# Patient Record
Sex: FEMALE | Race: WHITE | NOT HISPANIC OR LATINO | Employment: OTHER | URBAN - NONMETROPOLITAN AREA
[De-identification: names, ages, dates, MRNs, and addresses within clinical notes are randomized per-mention and may not be internally consistent; named-entity substitution may affect disease eponyms.]

---

## 2017-05-24 ENCOUNTER — OFFICE VISIT (OUTPATIENT)
Dept: CARDIOLOGY | Facility: CLINIC | Age: 72
End: 2017-05-24

## 2017-05-24 VITALS
DIASTOLIC BLOOD PRESSURE: 95 MMHG | WEIGHT: 148.8 LBS | HEART RATE: 90 BPM | HEIGHT: 62 IN | OXYGEN SATURATION: 98 % | BODY MASS INDEX: 27.38 KG/M2 | SYSTOLIC BLOOD PRESSURE: 166 MMHG

## 2017-05-24 DIAGNOSIS — F17.200 SMOKING: ICD-10-CM

## 2017-05-24 DIAGNOSIS — I77.9 BILATERAL CAROTID ARTERY DISEASE (HCC): ICD-10-CM

## 2017-05-24 DIAGNOSIS — E78.5 HYPERLIPEMIA: ICD-10-CM

## 2017-05-24 DIAGNOSIS — I42.9 CARDIOMYOPATHY (HCC): ICD-10-CM

## 2017-05-24 DIAGNOSIS — Z00.00 HEALTH MAINTENANCE EXAMINATION: ICD-10-CM

## 2017-05-24 DIAGNOSIS — E78.5 DYSLIPIDEMIA: ICD-10-CM

## 2017-05-24 DIAGNOSIS — I42.0 CARDIOMYOPATHY, DILATED (HCC): ICD-10-CM

## 2017-05-24 DIAGNOSIS — R06.02 SOB (SHORTNESS OF BREATH) ON EXERTION: ICD-10-CM

## 2017-05-24 DIAGNOSIS — R55 SYNCOPE, UNSPECIFIED SYNCOPE TYPE: ICD-10-CM

## 2017-05-24 DIAGNOSIS — Z00.00 HEALTHCARE MAINTENANCE: ICD-10-CM

## 2017-05-24 DIAGNOSIS — E78.5 HYPERLIPIDEMIA, UNSPECIFIED HYPERLIPIDEMIA TYPE: ICD-10-CM

## 2017-05-24 DIAGNOSIS — I10 ESSENTIAL HYPERTENSION: Primary | ICD-10-CM

## 2017-05-24 PROBLEM — IMO0001 SMOKING: Status: ACTIVE | Noted: 2017-05-24

## 2017-05-24 PROCEDURE — 93000 ELECTROCARDIOGRAM COMPLETE: CPT | Performed by: NURSE PRACTITIONER

## 2017-05-24 PROCEDURE — 99214 OFFICE O/P EST MOD 30 MIN: CPT | Performed by: NURSE PRACTITIONER

## 2017-05-24 RX ORDER — PRAVASTATIN SODIUM 40 MG
40 TABLET ORAL DAILY
Qty: 90 TABLET | Refills: 3 | Status: SHIPPED | OUTPATIENT
Start: 2017-05-24 | End: 2018-04-26 | Stop reason: SDUPTHER

## 2017-05-24 RX ORDER — CLONIDINE HYDROCHLORIDE 0.1 MG/1
TABLET ORAL
Qty: 30 TABLET | Refills: 5 | Status: SHIPPED | OUTPATIENT
Start: 2017-05-24 | End: 2018-04-26 | Stop reason: SDUPTHER

## 2017-05-24 RX ORDER — FUROSEMIDE 20 MG/1
20 TABLET ORAL DAILY
Qty: 90 TABLET | Refills: 3 | Status: SHIPPED | OUTPATIENT
Start: 2017-05-24 | End: 2018-04-26 | Stop reason: SDUPTHER

## 2017-05-24 RX ORDER — NICOTINE 21-14-7MG
KIT TRANSDERMAL
Qty: 56 EACH | Refills: 2 | Status: SHIPPED | OUTPATIENT
Start: 2017-05-24 | End: 2018-08-30

## 2017-05-24 RX ORDER — AMLODIPINE BESYLATE 2.5 MG/1
2.5 TABLET ORAL DAILY
Qty: 90 TABLET | Refills: 3 | Status: SHIPPED | OUTPATIENT
Start: 2017-05-24 | End: 2017-07-19

## 2017-05-24 RX ORDER — METOPROLOL SUCCINATE 25 MG/1
25 TABLET, EXTENDED RELEASE ORAL DAILY
Qty: 90 TABLET | Refills: 3 | Status: SHIPPED | OUTPATIENT
Start: 2017-05-24 | End: 2018-04-26 | Stop reason: SDUPTHER

## 2017-05-24 RX ORDER — ASPIRIN 81 MG/1
81 TABLET ORAL DAILY
Qty: 90 TABLET | Refills: 3 | Status: SHIPPED | OUTPATIENT
Start: 2017-05-24 | End: 2019-05-28 | Stop reason: SDUPTHER

## 2017-06-21 ENCOUNTER — HOSPITAL ENCOUNTER (OUTPATIENT)
Dept: CARDIOLOGY | Facility: HOSPITAL | Age: 72
Discharge: HOME | End: 2017-06-21
Admitting: NURSE PRACTITIONER

## 2017-06-21 ENCOUNTER — OUTSIDE FACILITY SERVICE (OUTPATIENT)
Dept: CARDIOLOGY | Facility: CLINIC | Age: 72
End: 2017-06-21

## 2017-06-21 ENCOUNTER — HOSPITAL ENCOUNTER (OUTPATIENT)
Dept: CARDIOLOGY | Facility: HOSPITAL | Age: 72
Discharge: HOME | End: 2017-06-21

## 2017-06-21 PROCEDURE — 93880 EXTRACRANIAL BILAT STUDY: CPT | Performed by: INTERNAL MEDICINE

## 2017-06-21 PROCEDURE — 93306 TTE W/DOPPLER COMPLETE: CPT

## 2017-06-21 PROCEDURE — 93306 TTE W/DOPPLER COMPLETE: CPT | Performed by: INTERNAL MEDICINE

## 2017-06-21 PROCEDURE — 93880 EXTRACRANIAL BILAT STUDY: CPT

## 2017-07-06 ENCOUNTER — DOCUMENTATION (OUTPATIENT)
Dept: CARDIOLOGY | Facility: CLINIC | Age: 72
End: 2017-07-06

## 2017-07-06 NOTE — PROGRESS NOTES
Echo results back in the office, 1-2 mo. F/u recommended. Patient has an appt. Here on 7-19-17. PH,LPN

## 2017-07-19 ENCOUNTER — OFFICE VISIT (OUTPATIENT)
Dept: CARDIOLOGY | Facility: CLINIC | Age: 72
End: 2017-07-19

## 2017-07-19 ENCOUNTER — TELEPHONE (OUTPATIENT)
Dept: CARDIOLOGY | Facility: CLINIC | Age: 72
End: 2017-07-19

## 2017-07-19 VITALS
BODY MASS INDEX: 28.12 KG/M2 | SYSTOLIC BLOOD PRESSURE: 154 MMHG | HEIGHT: 62 IN | HEART RATE: 95 BPM | WEIGHT: 152.8 LBS | DIASTOLIC BLOOD PRESSURE: 73 MMHG | OXYGEN SATURATION: 97 %

## 2017-07-19 DIAGNOSIS — I38 HEART VALVE DISEASE: ICD-10-CM

## 2017-07-19 DIAGNOSIS — I42.9 CARDIOMYOPATHY (HCC): ICD-10-CM

## 2017-07-19 DIAGNOSIS — E78.5 DYSLIPIDEMIA: ICD-10-CM

## 2017-07-19 DIAGNOSIS — I10 ESSENTIAL HYPERTENSION: Primary | ICD-10-CM

## 2017-07-19 DIAGNOSIS — F17.200 SMOKING: ICD-10-CM

## 2017-07-19 PROCEDURE — 99213 OFFICE O/P EST LOW 20 MIN: CPT | Performed by: NURSE PRACTITIONER

## 2017-07-19 RX ORDER — LISINOPRIL 5 MG/1
5 TABLET ORAL DAILY
Qty: 90 TABLET | Refills: 3 | Status: SHIPPED | OUTPATIENT
Start: 2017-07-19 | End: 2018-04-26 | Stop reason: SDUPTHER

## 2017-07-19 NOTE — PROGRESS NOTES
Subjective   Marci Stuart is a 72 y.o. female     Chief Complaint   Patient presents with   • Follow-up     patient appears in office today for follow up on test results       HPI    Problem List:    1. Dilated cardiomyopathy, EF initially felt to be 30 to 35%.  1.1 Most recent echocardiogram indicated , EF of 40-45%.  1.2 Low risk stress test in work-up for dilated cardiomyopathy.  1.3 Echo 6/21/17-mild LVH, EF 40-45%, diastolic dysfunction 2, moderate AR, mild MR, physiological TR, mild OH.  2. Valvular heart disease   a. Moderate MR, Mild-Mod TR, moderate AI and mild-mod PI  2. Hypertension  3. Dyslipidemia  4. Tobacco Habituation, smokes 1/2 pack every 2 days  5. Carotid Duplex 4/2/15 - 16-49% narrowing to HAZEL and LICA, antegrade flow   5.1 carotid ultrasound 6/21/17-16-49% bilateral internal carotid arteries.  To moderate carotid bifurcation disease bilaterally with no flow limiting stenosis on either side.  Antegrade flow both vertebral arteries.    Patient is a 72-year-old female who presents today for a follow-up.  She denies any chest pain, pressure, palpitations, fluttering, dizziness, presyncope, syncope, orthopnea, PND or edema.  She denies any shortness of breath with activity.  She has done really well with the nicotine patches she is down to only 2-3 cigarettes a day.  Says her blood pressure home runs anywhere from 120 to 130 systolic.  Patient says she does have a lot of stress and that's why she still smoking some due to the fact her family always comes to her for advice but then doesn't take it.    We went over her carotid ultrasound, echo and labs.    Current Outpatient Prescriptions   Medication Sig Dispense Refill   • aspirin 81 MG EC tablet Take 1 tablet by mouth Daily. 90 tablet 3   • Calcium Carbonate (CALTRATE 600) 1500 (600 CA) MG tablet Take 1 tablet by mouth daily. 2 daily     • CloNIDine (CATAPRES) 0.1 MG tablet Every 8 hours for SBP > 160 or DBP > 90 30 tablet 5   • furosemide  (LASIX) 20 MG tablet Take 1 tablet by mouth Daily. 90 tablet 3   • metoprolol succinate XL (TOPROL-XL) 25 MG 24 hr tablet Take 1 tablet by mouth Daily. 90 tablet 3   • MULTIPLE VITAMIN PO Take  by mouth daily.     • Nicotine 21-14-7 MG/24HR kit 3 Step pack 21-14-7; 1 patch every 24 hrs 56 each 2   • pravastatin (PRAVACHOL) 40 MG tablet Take 1 tablet by mouth Daily. 90 tablet 3   • lisinopril (PRINIVIL,ZESTRIL) 5 MG tablet Take 1 tablet by mouth Daily. 90 tablet 3     No current facility-administered medications for this visit.        ALLERGIES    Review of patient's allergies indicates no known allergies.    Past Medical History:   Diagnosis Date   • Coronary artery disease    • Hyperlipidemia    • Hypertension        Social History     Social History   • Marital status:      Spouse name: N/A   • Number of children: N/A   • Years of education: N/A     Occupational History   • Not on file.     Social History Main Topics   • Smoking status: Current Every Day Smoker     Packs/day: 0.50     Types: Cigarettes   • Smokeless tobacco: Never Used      Comment: LESS THAN 1 PPD   • Alcohol use No      Comment: A LITTLE WINE   • Drug use: No   • Sexual activity: Not on file     Other Topics Concern   • Not on file     Social History Narrative       Family History   Problem Relation Age of Onset   • Heart attack Father    • Diabetes Other    • Hyperlipidemia Other    • Hypertension Other        Review of Systems   Constitutional: Negative for diaphoresis and fatigue.   HENT: Positive for rhinorrhea. Negative for sneezing.    Eyes: Positive for visual disturbance (wears glasses if driving/reading/or coloring).   Respiratory: Negative for cough, chest tightness, shortness of breath and wheezing.    Cardiovascular: Negative for chest pain, palpitations and leg swelling.   Gastrointestinal: Negative for abdominal pain, nausea and vomiting.   Endocrine: Negative for cold intolerance, heat intolerance, polyphagia and polyuria.  "  Genitourinary: Negative for difficulty urinating, frequency and urgency.   Musculoskeletal: Positive for arthralgias (back) and back pain (due to arthritis). Negative for myalgias, neck pain and neck stiffness.   Skin: Negative.  Negative for rash and wound.   Allergic/Immunologic: Positive for environmental allergies. Negative for food allergies.   Neurological: Negative for dizziness, weakness, light-headedness and headaches.   Hematological: Bruises/bleeds easily (bruises and bleeds easily).   Psychiatric/Behavioral: Positive for agitation (easily agitated). Negative for confusion and sleep disturbance. The patient is nervous/anxious (stays anxious).        Objective   /73 (BP Location: Left arm, Patient Position: Sitting)  Pulse 95  Ht 62\" (157.5 cm)  Wt 152 lb 12.8 oz (69.3 kg)  SpO2 97%  BMI 27.95 kg/m2  Lab Results (most recent)     None        Physical Exam   Constitutional: She is oriented to person, place, and time. Vital signs are normal. She appears well-developed and well-nourished. She is active and cooperative.   HENT:   Head: Normocephalic.   Eyes: Lids are normal.   Wears glasses    Neck: Normal carotid pulses, no hepatojugular reflux and no JVD present. Carotid bruit is not present.   Cardiovascular: Normal rate, regular rhythm and normal heart sounds.    Pulses:       Radial pulses are 2+ on the right side, and 2+ on the left side.        Dorsalis pedis pulses are 2+ on the right side, and 2+ on the left side.        Posterior tibial pulses are 2+ on the right side, and 2+ on the left side.   No edema BLE.    Pulmonary/Chest: Effort normal and breath sounds normal.   Abdominal: Normal appearance and bowel sounds are normal.   Neurological: She is alert and oriented to person, place, and time.   Skin: Skin is warm, dry and intact.   Psychiatric: She has a normal mood and affect. Her speech is normal and behavior is normal. Judgment and thought content normal. Cognition and memory are " normal.       Procedure   Procedures         Assessment/Plan      Diagnosis Plan   1. Essential hypertension  lisinopril (PRINIVIL,ZESTRIL) 5 MG tablet   2. Heart valve disease     3. Dyslipidemia     4. Smoking     5. Cardiomyopathy  lisinopril (PRINIVIL,ZESTRIL) 5 MG tablet       Return in about 6 weeks (around 8/30/2017).       hypertension/cardiomyopathy-we will stop Norvasc and start lisinopril to help improve her ejection fraction.  Heart valve disease-no change.  Lipidemia-she is on pravastatin and her cholesterol is doing well.  She is going to be given information regarding a low triglyceride diet.  Smoking-she has been applauded him encouraged on her smoking cessation efforts.  Patient is doing very well from a cardiac standpoint.  She will continue her medication regimen otherwise.  She will follow-up in 6 weeks or sooner if any changes.  Determine how she is tolerating the lisinopril and how her blood pressure is running.

## 2017-07-19 NOTE — PATIENT INSTRUCTIONS
Hypertension  Hypertension, commonly called high blood pressure, is when the force of blood pumping through your arteries is too strong. Your arteries are the blood vessels that carry blood from your heart throughout your body. A blood pressure reading consists of a higher number over a lower number, such as 110/72. The higher number (systolic) is the pressure inside your arteries when your heart pumps. The lower number (diastolic) is the pressure inside your arteries when your heart relaxes. Ideally you want your blood pressure below 120/80.  Hypertension forces your heart to work harder to pump blood. Your arteries may become narrow or stiff. Having untreated or uncontrolled hypertension can cause heart attack, stroke, kidney disease, and other problems.  RISK FACTORS  Some risk factors for high blood pressure are controllable. Others are not.   Risk factors you cannot control include:   · Race. You may be at higher risk if you are .  · Age. Risk increases with age.  · Gender. Men are at higher risk than women before age 45 years. After age 65, women are at higher risk than men.  Risk factors you can control include:  · Not getting enough exercise or physical activity.  · Being overweight.  · Getting too much fat, sugar, calories, or salt in your diet.  · Drinking too much alcohol.  SIGNS AND SYMPTOMS  Hypertension does not usually cause signs or symptoms. Extremely high blood pressure (hypertensive crisis) may cause headache, anxiety, shortness of breath, and nosebleed.  DIAGNOSIS  To check if you have hypertension, your health care provider will measure your blood pressure while you are seated, with your arm held at the level of your heart. It should be measured at least twice using the same arm. Certain conditions can cause a difference in blood pressure between your right and left arms. A blood pressure reading that is higher than normal on one occasion does not mean that you need treatment. If  it is not clear whether you have high blood pressure, you may be asked to return on a different day to have your blood pressure checked again. Or, you may be asked to monitor your blood pressure at home for 1 or more weeks.  TREATMENT  Treating high blood pressure includes making lifestyle changes and possibly taking medicine. Living a healthy lifestyle can help lower high blood pressure. You may need to change some of your habits.  Lifestyle changes may include:  · Following the DASH diet. This diet is high in fruits, vegetables, and whole grains. It is low in salt, red meat, and added sugars.  · Keep your sodium intake below 2,300 mg per day.  · Getting at least 30-45 minutes of aerobic exercise at least 4 times per week.  · Losing weight if necessary.  · Not smoking.  · Limiting alcoholic beverages.  · Learning ways to reduce stress.  Your health care provider may prescribe medicine if lifestyle changes are not enough to get your blood pressure under control, and if one of the following is true:  · You are 18-59 years of age and your systolic blood pressure is above 140.  · You are 60 years of age or older, and your systolic blood pressure is above 150.  · Your diastolic blood pressure is above 90.  · You have diabetes, and your systolic blood pressure is over 140 or your diastolic blood pressure is over 90.  · You have kidney disease and your blood pressure is above 140/90.  · You have heart disease and your blood pressure is above 140/90.  Your personal target blood pressure may vary depending on your medical conditions, your age, and other factors.  HOME CARE INSTRUCTIONS  · Have your blood pressure rechecked as directed by your health care provider.    · Take medicines only as directed by your health care provider. Follow the directions carefully. Blood pressure medicines must be taken as prescribed. The medicine does not work as well when you skip doses. Skipping doses also puts you at risk for  "problems.  · Do not smoke.    · Monitor your blood pressure at home as directed by your health care provider.   SEEK MEDICAL CARE IF:   · You think you are having a reaction to medicines taken.  · You have recurrent headaches or feel dizzy.  · You have swelling in your ankles.  · You have trouble with your vision.  SEEK IMMEDIATE MEDICAL CARE IF:  · You develop a severe headache or confusion.  · You have unusual weakness, numbness, or feel faint.  · You have severe chest or abdominal pain.  · You vomit repeatedly.  · You have trouble breathing.  MAKE SURE YOU:   · Understand these instructions.  · Will watch your condition.  · Will get help right away if you are not doing well or get worse.     This information is not intended to replace advice given to you by your health care provider. Make sure you discuss any questions you have with your health care provider.     Document Released: 12/18/2006 Document Revised: 05/03/2016 Document Reviewed: 10/10/2014  [employer] Interactive Patient Education ©2017 [employer]  Food Choices to Lower Your Triglycerides  Triglycerides are a type of fat in your blood. High levels of triglycerides can increase the risk of heart disease and stroke. If your triglyceride levels are high, the foods you eat and your eating habits are very important. Choosing the right foods can help lower your triglycerides.   WHAT GENERAL GUIDELINES DO I NEED TO FOLLOW?  · Lose weight if you are overweight.    · Limit or avoid alcohol.    · Fill one half of your plate with vegetables and green salads.    · Limit fruit to two servings a day. Choose fruit instead of juice.    · Make one fourth of your plate whole grains. Look for the word \"whole\" as the first word in the ingredient list.  · Fill one fourth of your plate with lean protein foods.  · Enjoy fatty fish (such as salmon, mackerel, sardines, and tuna) three times a week.    · Choose healthy fats.    · Limit foods high in starch and sugar.  · Eat " more home-cooked food and less restaurant, buffet, and fast food.  · Limit fried foods.  · Cook foods using methods other than frying.  · Limit saturated fats.  · Check ingredient lists to avoid foods with partially hydrogenated oils (trans fats) in them.  WHAT FOODS CAN I EAT?   Grains  Whole grains, such as whole wheat or whole grain breads, crackers, cereals, and pasta. Unsweetened oatmeal, bulgur, barley, quinoa, or brown rice. Corn or whole wheat flour tortillas.   Vegetables  Fresh or frozen vegetables (raw, steamed, roasted, or grilled). Green salads.  Fruits  All fresh, canned (in natural juice), or frozen fruits.  Meat and Other Protein Products  Ground beef (85% or leaner), grass-fed beef, or beef trimmed of fat. Skinless chicken or turkey. Ground chicken or turkey. Pork trimmed of fat. All fish and seafood. Eggs. Dried beans, peas, or lentils. Unsalted nuts or seeds. Unsalted canned or dry beans.  Dairy  Low-fat dairy products, such as skim or 1% milk, 2% or reduced-fat cheeses, low-fat ricotta or cottage cheese, or plain low-fat yogurt.  Fats and Oils  Tub margarines without trans fats. Light or reduced-fat mayonnaise and salad dressings. Avocado. Safflower, olive, or canola oils. Natural peanut or almond butter.  The items listed above may not be a complete list of recommended foods or beverages. Contact your dietitian for more options.  WHAT FOODS ARE NOT RECOMMENDED?   Grains  White bread. White pasta. White rice. Cornbread. Bagels, pastries, and croissants. Crackers that contain trans fat.  Vegetables  White potatoes. Corn. Creamed or fried vegetables. Vegetables in a cheese sauce.  Fruits  Dried fruits. Canned fruit in light or heavy syrup. Fruit juice.  Meat and Other Protein Products  Fatty cuts of meat. Ribs, chicken wings, rosa, sausage, bologna, salami, chitterlings, fatback, hot dogs, bratwurst, and packaged luncheon meats.  Dairy  Whole or 2% milk, cream, half-and-half, and cream cheese.  "Whole-fat or sweetened yogurt. Full-fat cheeses. Nondairy creamers and whipped toppings. Processed cheese, cheese spreads, or cheese curds.  Sweets and Desserts  Corn syrup, sugars, honey, and molasses. Candy. Jam and jelly. Syrup. Sweetened cereals. Cookies, pies, cakes, donuts, muffins, and ice cream.  Fats and Oils  Butter, stick margarine, lard, shortening, ghee, or rosa fat. Coconut, palm kernel, or palm oils.  Beverages  Alcohol. Sweetened drinks (such as sodas, lemonade, and fruit drinks or punches).  The items listed above may not be a complete list of foods and beverages to avoid. Contact your dietitian for more information.     This information is not intended to replace advice given to you by your health care provider. Make sure you discuss any questions you have with your health care provider.     Document Released: 10/05/2005 Document Revised: 01/08/2016 Document Reviewed: 10/22/2014  [employer] Interactive Patient Education ©2017 [employer].    You Can Quit Smoking  If you are ready to quit smoking or are thinking about it, congratulations! You have chosen to help yourself be healthier and live longer! There are lots of different ways to quit smoking. Nicotine gum, nicotine patches, a nicotine inhaler, or nicotine nasal spray can help with physical craving. Hypnosis, support groups, and medicines help break the habit of smoking.  TIPS TO GET OFF AND STAY OFF CIGARETTES  · Learn to predict your moods. Do not let a bad situation be your excuse to have a cigarette. Some situations in your life might tempt you to have a cigarette.  · Ask friends and co-workers not to smoke around you.  · Make your home smoke-free.  · Never have \"just one\" cigarette. It leads to wanting another and another. Remind yourself of your decision to quit.  · On a card, make a list of your reasons for not smoking. Read it at least the same number of times a day as you have a cigarette. Tell yourself everyday, \"I do not want to " "smoke. I choose not to smoke.\"  · Ask someone at home or work to help you with your plan to quit smoking.  · Have something planned after you eat or have a cup of coffee. Take a walk or get other exercise to perk you up. This will help to keep you from overeating.  · Try a relaxation exercise to calm you down and decrease your stress. Remember, you may be tense and nervous the first two weeks after you quit. This will pass.  · Find new activities to keep your hands busy. Play with a pen, coin, or rubber band. Doodle or draw things on paper.  · Brush your teeth right after eating. This will help cut down the craving for the taste of tobacco after meals. You can try mouthwash too.  · Try gum, breath mints, or diet candy to keep something in your mouth.  IF YOU SMOKE AND WANT TO QUIT:  · Do not stock up on cigarettes. Never buy a carton. Wait until one pack is finished before you buy another.  · Never carry cigarettes with you at work or at home.  · Keep cigarettes as far away from you as possible. Leave them with someone else.  · Never carry matches or a lighter with you.  · Ask yourself, \"Do I need this cigarette or is this just a reflex?\"  · Bet with someone that you can quit. Put cigarette money in a [employer] bank every morning. If you smoke, you give up the money. If you do not smoke, by the end of the week, you keep the money.  · Keep trying. It takes 21 days to change a habit!  · Talk to your doctor about using medicines to help you quit. These include nicotine replacement gum, lozenges, or skin patches.     This information is not intended to replace advice given to you by your health care provider. Make sure you discuss any questions you have with your health care provider.     Document Released: 10/14/2010 Document Revised: 03/11/2013 Document Reviewed: 05/03/2016  [employer] Interactive Patient Education ©2017 [employer]    "

## 2017-08-30 ENCOUNTER — OFFICE VISIT (OUTPATIENT)
Dept: CARDIOLOGY | Facility: CLINIC | Age: 72
End: 2017-08-30

## 2017-08-30 VITALS
HEIGHT: 62 IN | OXYGEN SATURATION: 100 % | DIASTOLIC BLOOD PRESSURE: 68 MMHG | BODY MASS INDEX: 27.6 KG/M2 | WEIGHT: 150 LBS | HEART RATE: 70 BPM | SYSTOLIC BLOOD PRESSURE: 134 MMHG

## 2017-08-30 DIAGNOSIS — I10 ESSENTIAL HYPERTENSION: Primary | ICD-10-CM

## 2017-08-30 DIAGNOSIS — I42.0 CARDIOMYOPATHY, DILATED (HCC): ICD-10-CM

## 2017-08-30 DIAGNOSIS — E78.5 DYSLIPIDEMIA: ICD-10-CM

## 2017-08-30 PROCEDURE — 99213 OFFICE O/P EST LOW 20 MIN: CPT | Performed by: NURSE PRACTITIONER

## 2017-08-30 RX ORDER — AZITHROMYCIN 250 MG/1
250 TABLET, FILM COATED ORAL DAILY
COMMUNITY
Start: 2017-08-28 | End: 2018-08-30

## 2017-08-30 NOTE — PATIENT INSTRUCTIONS

## 2017-08-30 NOTE — PROGRESS NOTES
Subjective   Marci Stuart is a 72 y.o. female     Chief Complaint   Patient presents with   • Follow-up     patient appears in office today for routine follow up ; patient denies any  new onset cardiac issues at this time; she did state she currentyl has URI which PCP is treating    • Hypertension     patient states B/P has been WNL with medications        HPI    Problem List:    1. Dilated cardiomyopathy, EF initially felt to be 30 to 35%.  1.1 Most recent echocardiogram indicated , EF of 40-45%.  1.2 Low risk stress test in work-up for dilated cardiomyopathy.  1.3 Echo 6/21/17-mild LVH, EF 40-45%, diastolic dysfunction 2, moderate AR, mild MR, physiological TR, mild MA.  2. Valvular heart disease   a. Moderate MR, Mild-Mod TR, moderate AI and mild-mod PI  2. Hypertension  3. Dyslipidemia  4. Tobacco Habituation - stopped 8/15/17  5. Carotid Duplex 4/2/15 - 16-49% narrowing to HAZEL and LICA, antegrade flow   5.1 carotid ultrasound 6/21/17-16-49% bilateral internal carotid arteries.  To moderate carotid bifurcation disease bilaterally with no flow limiting stenosis on either side.  Antegrade flow both vertebral arteries.    Patient is a 72-year-old female who presents today for follow-up.  She denies any chest pain, pressure, palpitations, fluttering, dizziness, presyncope, syncope, orthopnea, PND or edema.  She does not have any shortness of breath with activity.  She says she has done very well she has taken up in fluttering to keep her from smoking.  She is using her nicotine patch and has not had a cigarette in 15 days which I applauded her.  Patient has current upper respiratory infection and PCP is currently treating her.  She says since she started lisinopril her blood pressure has been excellent.  PCP monitors her cholesterol.    Current Outpatient Prescriptions   Medication Sig Dispense Refill   • aspirin 81 MG EC tablet Take 1 tablet by mouth Daily. 90 tablet 3   • azithromycin (ZITHROMAX) 250 MG  tablet Take 250 mg by mouth Daily.     • Calcium Carbonate (CALTRATE 600) 1500 (600 CA) MG tablet Take 1 tablet by mouth daily. 2 daily     • CloNIDine (CATAPRES) 0.1 MG tablet Every 8 hours for SBP > 160 or DBP > 90 30 tablet 5   • furosemide (LASIX) 20 MG tablet Take 1 tablet by mouth Daily. 90 tablet 3   • lisinopril (PRINIVIL,ZESTRIL) 5 MG tablet Take 1 tablet by mouth Daily. 90 tablet 3   • metoprolol succinate XL (TOPROL-XL) 25 MG 24 hr tablet Take 1 tablet by mouth Daily. 90 tablet 3   • MULTIPLE VITAMIN PO Take  by mouth daily.     • Nicotine 21-14-7 MG/24HR kit 3 Step pack 21-14-7; 1 patch every 24 hrs 56 each 2   • pravastatin (PRAVACHOL) 40 MG tablet Take 1 tablet by mouth Daily. 90 tablet 3     No current facility-administered medications for this visit.        ALLERGIES    Review of patient's allergies indicates no known allergies.    Past Medical History:   Diagnosis Date   • Coronary artery disease    • Hyperlipidemia    • Hypertension        Social History     Social History   • Marital status:      Spouse name: N/A   • Number of children: N/A   • Years of education: N/A     Occupational History   • Not on file.     Social History Main Topics   • Smoking status: Current Every Day Smoker     Packs/day: 0.50     Types: Cigarettes   • Smokeless tobacco: Never Used      Comment: LESS THAN 1 PPD   • Alcohol use No      Comment: A LITTLE WINE   • Drug use: No   • Sexual activity: Not on file     Other Topics Concern   • Not on file     Social History Narrative       Family History   Problem Relation Age of Onset   • Heart attack Father    • Diabetes Other    • Hyperlipidemia Other    • Hypertension Other        Review of Systems   Constitutional: Negative for diaphoresis and fatigue.   HENT: Positive for congestion (due to URI), sinus pressure (due to URI) and sneezing (due to URI).         Pt currently has URI ; PCP has on Z-Pack at this time; has had for 2 weeks - PCP on Monday      Eyes: Positive  "for visual disturbance (wears glasses daily).   Respiratory: Positive for cough (pt states she has been coughing due to URI). Negative for chest tightness (denies chest tightness), shortness of breath (deneis SOB) and wheezing (denies wheezing).    Cardiovascular: Negative for chest pain (denies CP), palpitations and leg swelling.   Gastrointestinal: Negative for abdominal pain, nausea and vomiting.   Endocrine: Negative for cold intolerance, heat intolerance, polyphagia and polyuria.   Genitourinary: Negative for difficulty urinating, frequency and urgency.   Musculoskeletal: Negative for arthralgias, back pain, myalgias, neck pain and neck stiffness.   Skin: Negative.  Negative for rash and wound.   Allergic/Immunologic: Positive for environmental allergies (seasonal allergies). Negative for food allergies.   Neurological: Negative for dizziness, weakness and headaches.   Hematological: Bruises/bleeds easily (bruises easily).   Psychiatric/Behavioral: Negative for agitation, confusion and sleep disturbance (denies waking up smothering). The patient is not nervous/anxious.        Objective   /68 (BP Location: Left arm, Patient Position: Sitting)  Pulse 70  Ht 62\" (157.5 cm)  Wt 150 lb (68 kg)  SpO2 100%  BMI 27.44 kg/m2  Lab Results (most recent)     None        Physical Exam   Constitutional: She is oriented to person, place, and time. Vital signs are normal. She appears well-developed and well-nourished. She is active and cooperative.   HENT:   Head: Normocephalic.   Eyes: Lids are normal.   Wears glasses    Neck: Normal carotid pulses, no hepatojugular reflux and no JVD present. Carotid bruit is not present.   Cardiovascular: Normal rate, regular rhythm and normal heart sounds.    Pulses:       Radial pulses are 2+ on the right side, and 2+ on the left side.        Dorsalis pedis pulses are 2+ on the right side, and 2+ on the left side.        Posterior tibial pulses are 2+ on the right side, and 2+ " on the left side.   No edema BLE.    Pulmonary/Chest: Effort normal and breath sounds normal.   Abdominal: Normal appearance and bowel sounds are normal.   Neurological: She is alert and oriented to person, place, and time.   Skin: Skin is warm, dry and intact.   Psychiatric: She has a normal mood and affect. Her speech is normal and behavior is normal. Judgment and thought content normal. Cognition and memory are normal.       Procedure   Procedures         Assessment/Plan      Diagnosis Plan   1. Essential hypertension     2. Dyslipidemia     3. Cardiomyopathy, dilated         Return in about 1 year (around 8/30/2018).    Hypertension-doing very well.  Dyslipidemia-patient is on pravastatin and monitor by PCP.  Cardiomyopathy-patient is on Ace and beta blocker.  Patient has currently stopped smoking for the last 15 days and is using a nicotine patch.  She will continue her medication regimen and she would like to go back to her one-year follow-up.  She will come back sooner if any changes.

## 2018-04-26 DIAGNOSIS — I42.9 CARDIOMYOPATHY, UNSPECIFIED TYPE (HCC): ICD-10-CM

## 2018-04-26 DIAGNOSIS — I10 ESSENTIAL HYPERTENSION: ICD-10-CM

## 2018-04-26 DIAGNOSIS — E78.5 HYPERLIPIDEMIA, UNSPECIFIED HYPERLIPIDEMIA TYPE: ICD-10-CM

## 2018-04-26 RX ORDER — LISINOPRIL 5 MG/1
5 TABLET ORAL DAILY
Qty: 90 TABLET | Refills: 2 | Status: SHIPPED | OUTPATIENT
Start: 2018-04-26 | End: 2018-08-30 | Stop reason: SDUPTHER

## 2018-04-26 RX ORDER — FUROSEMIDE 20 MG/1
20 TABLET ORAL DAILY
Qty: 90 TABLET | Refills: 2 | Status: SHIPPED | OUTPATIENT
Start: 2018-04-26 | End: 2018-08-30 | Stop reason: SDUPTHER

## 2018-04-26 RX ORDER — PRAVASTATIN SODIUM 40 MG
40 TABLET ORAL DAILY
Qty: 90 TABLET | Refills: 2 | Status: SHIPPED | OUTPATIENT
Start: 2018-04-26 | End: 2018-08-30

## 2018-04-26 RX ORDER — CLONIDINE HYDROCHLORIDE 0.1 MG/1
TABLET ORAL
Qty: 180 TABLET | Refills: 2 | Status: SHIPPED | OUTPATIENT
Start: 2018-04-26 | End: 2021-04-22 | Stop reason: SDUPTHER

## 2018-04-26 RX ORDER — METOPROLOL SUCCINATE 25 MG/1
25 TABLET, EXTENDED RELEASE ORAL DAILY
Qty: 90 TABLET | Refills: 2 | Status: SHIPPED | OUTPATIENT
Start: 2018-04-26 | End: 2018-08-30 | Stop reason: SDUPTHER

## 2018-08-30 ENCOUNTER — OFFICE VISIT (OUTPATIENT)
Dept: CARDIOLOGY | Facility: CLINIC | Age: 73
End: 2018-08-30

## 2018-08-30 VITALS
OXYGEN SATURATION: 96 % | WEIGHT: 145 LBS | HEART RATE: 87 BPM | SYSTOLIC BLOOD PRESSURE: 117 MMHG | HEIGHT: 62 IN | BODY MASS INDEX: 26.68 KG/M2 | DIASTOLIC BLOOD PRESSURE: 64 MMHG

## 2018-08-30 DIAGNOSIS — F17.200 SMOKING: ICD-10-CM

## 2018-08-30 DIAGNOSIS — I10 ESSENTIAL HYPERTENSION: Primary | ICD-10-CM

## 2018-08-30 DIAGNOSIS — Z00.00 HEALTHCARE MAINTENANCE: ICD-10-CM

## 2018-08-30 DIAGNOSIS — E78.5 DYSLIPIDEMIA: ICD-10-CM

## 2018-08-30 DIAGNOSIS — I42.9 CARDIOMYOPATHY, UNSPECIFIED TYPE (HCC): ICD-10-CM

## 2018-08-30 PROCEDURE — 99213 OFFICE O/P EST LOW 20 MIN: CPT | Performed by: NURSE PRACTITIONER

## 2018-08-30 PROCEDURE — 93000 ELECTROCARDIOGRAM COMPLETE: CPT | Performed by: NURSE PRACTITIONER

## 2018-08-30 RX ORDER — LISINOPRIL 5 MG/1
5 TABLET ORAL DAILY
Qty: 90 TABLET | Refills: 2 | Status: SHIPPED | OUTPATIENT
Start: 2018-08-30 | End: 2019-03-11 | Stop reason: SDUPTHER

## 2018-08-30 RX ORDER — FUROSEMIDE 20 MG/1
20 TABLET ORAL DAILY
Qty: 90 TABLET | Refills: 2 | Status: SHIPPED | OUTPATIENT
Start: 2018-08-30 | End: 2019-01-31 | Stop reason: SDUPTHER

## 2018-08-30 RX ORDER — METOPROLOL SUCCINATE 25 MG/1
25 TABLET, EXTENDED RELEASE ORAL DAILY
Qty: 90 TABLET | Refills: 2 | Status: SHIPPED | OUTPATIENT
Start: 2018-08-30 | End: 2019-01-31 | Stop reason: SDUPTHER

## 2018-08-30 RX ORDER — OMEGA-3 FATTY ACIDS/FISH OIL 300-1000MG
1000 CAPSULE ORAL DAILY
Qty: 30 EACH | Refills: 0 | Status: SHIPPED | OUTPATIENT
Start: 2018-08-30 | End: 2018-10-29 | Stop reason: SDUPTHER

## 2018-08-30 NOTE — PATIENT INSTRUCTIONS
Dyslipidemia  Dyslipidemia is an imbalance of waxy, fat-like substances (lipids) in the blood. The body needs lipids in small amounts. Dyslipidemia often involves a high level of cholesterol or triglycerides, which are types of lipids.  Common forms of dyslipidemia include:  · High levels of bad cholesterol (LDL cholesterol). LDL is the type of cholesterol that causes fatty deposits (plaques) to build up in the blood vessels that carry blood away from your heart (arteries).  · Low levels of good cholesterol (HDL cholesterol). HDL cholesterol is the type of cholesterol that protects against heart disease. High levels of HDL remove the LDL buildup from arteries.  · High levels of triglycerides. Triglycerides are a fatty substance in the blood that is linked to a buildup of plaques in the arteries.    You can develop dyslipidemia because of the genes you are born with (primary dyslipidemia) or changes that occur during your life (secondary dyslipidemia), or as a side effect of certain medical treatments.  What are the causes?  Primary dyslipidemia is caused by changes (mutations) in genes that are passed down through families (inherited). These mutations cause several types of dyslipidemia. Mutations can result in disorders that make the body produce too much LDL cholesterol or triglycerides, or not enough HDL cholesterol. These disorders may lead to heart disease, arterial disease, or stroke at an early age.  Causes of secondary dyslipidemia include certain lifestyle choices and diseases that lead to dyslipidemia, such as:  · Eating a diet that is high in animal fat.  · Not getting enough activity or exercise (having a sedentary lifestyle).  · Having diabetes, kidney disease, liver disease, or thyroid disease.  · Drinking large amounts of alcohol.  · Using certain types of drugs.    What increases the risk?  You may be at greater risk for dyslipidemia if you are an older man or if you are a woman who has gone through  menopause. Other risk factors include:  · Having a family history of dyslipidemia.  · Taking certain medicines, including birth control pills, steroids, some diuretics, beta-blockers, and some medicines for HIV.  · Smoking cigarettes.  · Eating a high-fat diet.  · Drinking large amounts of alcohol.  · Having certain medical conditions such as diabetes, polycystic ovary syndrome (PCOS), pregnancy, kidney disease, liver disease, or hypothyroidism.  · Not exercising regularly.  · Being overweight or obese with too much belly fat.    What are the signs or symptoms?  Dyslipidemia does not usually cause any symptoms.  Very high lipid levels can cause fatty bumps under the skin (xanthomas) or a white or gray ring around the black center (pupil) of the eye. Very high triglyceride levels can cause inflammation of the pancreas (pancreatitis).  How is this diagnosed?  Your health care provider may diagnose dyslipidemia based on a routine blood test (fasting blood test). Because most people do not have symptoms of the condition, this blood testing (lipid profile) is done on adults age 20 and older and is repeated every 5 years. This test checks:  · Total cholesterol. This is a measure of the total amount of cholesterol in your blood, including LDL cholesterol, HDL cholesterol, and triglycerides. A healthy number is below 200.  · LDL cholesterol. The target number for LDL cholesterol is different for each person, depending on individual risk factors. For most people, a number below 100 is healthy. Ask your health care provider what your LDL cholesterol number should be.  · HDL cholesterol. An HDL level of 60 or higher is best because it helps to protect against heart disease. A number below 40 for men or below 50 for women increases the risk for heart disease.  · Triglycerides. A healthy triglyceride number is below 150.    If your lipid profile is abnormal, your health care provider may do other blood tests to get more  information about your condition.  How is this treated?  Treatment depends on the type of dyslipidemia that you have and your other risk factors for heart disease and stroke. Your health care provider will have a target range for your lipid levels based on this information.  For many people, treatment starts with lifestyle changes, such as diet and exercise. Your health care provider may recommend that you:  · Get regular exercise.  · Make changes to your diet.  · Quit smoking if you smoke.    If diet changes and exercise do not help you reach your goals, your health care provider may also prescribe medicine to lower lipids. The most commonly prescribed type of medicine lowers your LDL cholesterol (statin drug). If you have a high triglyceride level, your provider may prescribe another type of drug (fibrate) or an omega-3 fish oil supplement, or both.  Follow these instructions at home:  · Take over-the-counter and prescription medicines only as told by your health care provider. This includes supplements.  · Get regular exercise. Start an aerobic exercise and strength training program as told by your health care provider. Ask your health care provider what activities are safe for you. Your health care provider may recommend:  ? 30 minutes of aerobic activity 4-6 days a week. Brisk walking is an example of aerobic activity.  ? Strength training 2 days a week.  · Eat a healthy diet as told by your health care provider. This can help you reach and maintain a healthy weight, lower your LDL cholesterol, and raise your HDL cholesterol. It may help to work with a diet and nutrition specialist (dietitian) to make a plan that is right for you. Your dietitian or health care provider may recommend:  ? Limiting your calories, if you are overweight.  ? Eating more fruits, vegetables, whole grains, fish, and lean meats.  ? Limiting saturated fat, trans fat, and cholesterol.  · Follow instructions from your health care provider  or dietitian about eating or drinking restrictions.  · Limit alcohol intake to no more than one drink per day for nonpregnant women and two drinks per day for men. One drink equals 12 oz of beer, 5 oz of wine, or 1½ oz of hard liquor.  · Do not use any products that contain nicotine or tobacco, such as cigarettes and e-cigarettes. If you need help quitting, ask your health care provider.  · Keep all follow-up visits as told by your health care provider. This is important.  Contact a health care provider if:  · You are having trouble sticking to your exercise or diet plan.  · You are struggling to quit smoking or control your use of alcohol.  Summary  · Dyslipidemia is an imbalance of waxy, fat-like substances (lipids) in the blood. The body needs lipids in small amounts. Dyslipidemia often involves a high level of cholesterol or triglycerides, which are types of lipids.  · Treatment depends on the type of dyslipidemia that you have and your other risk factors for heart disease and stroke.  · For many people, treatment starts with lifestyle changes, such as diet and exercise. Your health care provider may also prescribe medicine to lower lipids.  This information is not intended to replace advice given to you by your health care provider. Make sure you discuss any questions you have with your health care provider.  Document Released: 12/23/2014 Document Revised: 08/14/2017 Document Reviewed: 08/14/2017  [employer] Patient Education © 2018 [employer]  Steps to Quit Smoking  Smoking tobacco can be bad for your health. It can also affect almost every organ in your body. Smoking puts you and people around you at risk for many serious long-lasting (chronic) diseases. Quitting smoking is hard, but it is one of the best things that you can do for your health. It is never too late to quit.  What are the benefits of quitting smoking?  When you quit smoking, you lower your risk for getting serious diseases and  conditions. They can include:  · Lung cancer or lung disease.  · Heart disease.  · Stroke.  · Heart attack.  · Not being able to have children (infertility).  · Weak bones (osteoporosis) and broken bones (fractures).    If you have coughing, wheezing, and shortness of breath, those symptoms may get better when you quit. You may also get sick less often. If you are pregnant, quitting smoking can help to lower your chances of having a baby of low birth weight.  What can I do to help me quit smoking?  Talk with your doctor about what can help you quit smoking. Some things you can do (strategies) include:  · Quitting smoking totally, instead of slowly cutting back how much you smoke over a period of time.  · Going to in-person counseling. You are more likely to quit if you go to many counseling sessions.  · Using resources and support systems, such as:  ? Online chats with a counselor.  ? Phone quitlines.  ? Printed self-help materials.  ? Support groups or group counseling.  ? Text messaging programs.  ? Mobile phone apps or applications.  · Taking medicines. Some of these medicines may have nicotine in them. If you are pregnant or breastfeeding, do not take any medicines to quit smoking unless your doctor says it is okay. Talk with your doctor about counseling or other things that can help you.    Talk with your doctor about using more than one strategy at the same time, such as taking medicines while you are also going to in-person counseling. This can help make quitting easier.  What things can I do to make it easier to quit?  Quitting smoking might feel very hard at first, but there is a lot that you can do to make it easier. Take these steps:  · Talk to your family and friends. Ask them to support and encourage you.  · Call phone quitlines, reach out to support groups, or work with a counselor.  · Ask people who smoke to not smoke around you.  · Avoid places that make you want (trigger) to smoke, such  as:  ? Bars.  ? Parties.  ? Smoke-break areas at work.  · Spend time with people who do not smoke.  · Lower the stress in your life. Stress can make you want to smoke. Try these things to help your stress:  ? Getting regular exercise.  ? Deep-breathing exercises.  ? Yoga.  ? Meditating.  ? Doing a body scan. To do this, close your eyes, focus on one area of your body at a time from head to toe, and notice which parts of your body are tense. Try to relax the muscles in those areas.  · Download or buy apps on your mobile phone or tablet that can help you stick to your quit plan. There are many free apps, such as QuitGuide from the CDC (Centers for Disease Control and Prevention). You can find more support from smokefree.gov and other websites.    This information is not intended to replace advice given to you by your health care provider. Make sure you discuss any questions you have with your health care provider.  Document Released: 10/14/2010 Document Revised: 08/15/2017 Document Reviewed: 05/03/2016  [employer] Interactive Patient Education © 2018 [employer]

## 2018-08-30 NOTE — PROGRESS NOTES
Subjective   Marci Stuart is a 73 y.o. female     Chief Complaint   Patient presents with   • Follow-up     presents for 1 year f/u   • Hypertension   • Shortness of Breath       HPI    Problem List:    1. Dilated cardiomyopathy, EF initially felt to be 30 to 35%.  1.1 Most recent echocardiogram indicated , EF of 40-45%.  1.2 Low risk stress test in work-up for dilated cardiomyopathy.  1.3 Echo 6/21/17-mild LVH, EF 40-45%, diastolic dysfunction 2, moderate AR, mild MR, physiological TR, mild MS.  2. Valvular heart disease   a. Moderate MR, Mild-Mod TR, moderate AI and mild-mod PI  2. Hypertension  3. Dyslipidemia  4. Tobacco Habituation - stopped 8/15/17  5. Carotid Duplex 4/2/15 - 16-49% narrowing to HAZEL and LICA, antegrade flow   5.1 carotid ultrasound 6/21/17-16-49% bilateral internal carotid arteries.  To moderate carotid bifurcation disease bilaterally with no flow limiting stenosis on either side.  Antegrade flow both vertebral arteries.    Patient is a 73-year-old female who presents today for follow-up.  He denies any chest pain, pressure, palpitations, fluttering, dizziness, presyncope, syncope, orthopnea, PND or edema.  She says she only gets short of breath with exertion if she is frustrated at something.  She has felt great and been taking care of her son to is about to have back surgery.  She still smokes about 10 cigarettes per day.  She is not had any recent blood work.  Patient will like to come off of her statin.    Current Outpatient Prescriptions   Medication Sig Dispense Refill   • aspirin 81 MG EC tablet Take 1 tablet by mouth Daily. 90 tablet 3   • Calcium Carbonate (CALTRATE 600) 1500 (600 CA) MG tablet Take 1 tablet by mouth daily. 2 daily     • CloNIDine (CATAPRES) 0.1 MG tablet Every 8 hours for SBP > 160 or DBP > 90 180 tablet 2   • furosemide (LASIX) 20 MG tablet Take 1 tablet by mouth Daily. 90 tablet 2   • lisinopril (PRINIVIL,ZESTRIL) 5 MG tablet Take 1 tablet by mouth Daily. 90  tablet 2   • metoprolol succinate XL (TOPROL-XL) 25 MG 24 hr tablet Take 1 tablet by mouth Daily. 90 tablet 2   • MULTIPLE VITAMIN PO Take  by mouth daily.     • Omega 3 1000 MG capsule Take 1,000 mg by mouth Daily. 30 each 0     No current facility-administered medications for this visit.        ALLERGIES    Patient has no known allergies.    Past Medical History:   Diagnosis Date   • Coronary artery disease    • Hyperlipidemia    • Hypertension        Social History     Social History   • Marital status:      Spouse name: N/A   • Number of children: N/A   • Years of education: N/A     Occupational History   • Not on file.     Social History Main Topics   • Smoking status: Current Every Day Smoker     Packs/day: 0.50     Types: Cigarettes   • Smokeless tobacco: Never Used      Comment: LESS THAN 1 PPD   • Alcohol use No      Comment: A LITTLE WINE   • Drug use: No   • Sexual activity: Not on file     Other Topics Concern   • Not on file     Social History Narrative   • No narrative on file       Family History   Problem Relation Age of Onset   • Heart attack Father    • Diabetes Other    • Hyperlipidemia Other    • Hypertension Other    • No Known Problems Mother        Review of Systems   Constitutional: Negative for chills, diaphoresis, fatigue and fever.   HENT: Negative for congestion, nosebleeds, postnasal drip, rhinorrhea, sinus pain, sinus pressure and tinnitus.    Eyes: Positive for visual disturbance (wears glasses ).   Respiratory: Positive for shortness of breath (on exertion; if she gets frusterated at something ). Negative for apnea, chest tightness and wheezing.    Cardiovascular: Negative for chest pain, palpitations and leg swelling.   Gastrointestinal: Negative for abdominal pain, blood in stool, constipation, diarrhea, nausea and vomiting.   Endocrine: Negative.    Genitourinary: Negative for hematuria.   Musculoskeletal: Positive for arthralgias and back pain. Negative for myalgias and  "neck pain.   Allergic/Immunologic: Positive for environmental allergies.   Neurological: Negative for dizziness, syncope, weakness, light-headedness, numbness and headaches.   Hematological: Bruises/bleeds easily (bruise).   Psychiatric/Behavioral: Negative for agitation, confusion and sleep disturbance. The patient is not nervous/anxious.        Objective   /64 (BP Location: Left arm, Patient Position: Sitting)   Pulse 87   Ht 157.5 cm (62.01\")   Wt 65.8 kg (145 lb)   SpO2 96%   BMI 26.51 kg/m²   Vitals:    08/30/18 1209   BP: 117/64   BP Location: Left arm   Patient Position: Sitting   Pulse: 87   SpO2: 96%   Weight: 65.8 kg (145 lb)   Height: 157.5 cm (62.01\")      Lab Results (most recent)     None        Physical Exam   Constitutional: She is oriented to person, place, and time. Vital signs are normal. She appears well-developed and well-nourished. She is active and cooperative.   HENT:   Head: Normocephalic.   Eyes: Lids are normal.   Wears glasses    Cardiovascular: Normal rate, regular rhythm and normal heart sounds.    Pulses:       Radial pulses are 2+ on the right side, and 2+ on the left side.        Dorsalis pedis pulses are 2+ on the right side, and 2+ on the left side.        Posterior tibial pulses are 2+ on the right side, and 2+ on the left side.   No edema BLE.   Pulmonary/Chest: Effort normal and breath sounds normal.   Abdominal: Normal appearance and bowel sounds are normal.   Neurological: She is alert and oriented to person, place, and time.   Skin: Skin is warm, dry and intact.   Psychiatric: She has a normal mood and affect. Her speech is normal and behavior is normal. Judgment and thought content normal. Cognition and memory are normal.       Procedure     ECG 12 Lead  Date/Time: 8/30/2018 12:27 PM  Performed by: SMITH HAMM  Authorized by: SMITH HAMM   Comparison: compared with previous ECG from 5/24/2017  Similar to previous ECG  Rhythm: sinus rhythm  Rate: " normal  BPM: 83  QRS axis: left  Other findings: LVH  Clinical impression: non-specific ECG                 Assessment/Plan      Diagnosis Plan   1. Essential hypertension  metoprolol succinate XL (TOPROL-XL) 25 MG 24 hr tablet    lisinopril (PRINIVIL,ZESTRIL) 5 MG tablet    ECG 12 Lead    TSH   2. Healthcare maintenance  Lipid Panel    Comprehensive Metabolic Panel    TSH   3. Dyslipidemia  Lipid Panel    Comprehensive Metabolic Panel    Omega 3 1000 MG capsule    TSH   4. Smoking     5. Cardiomyopathy, unspecified type (CMS/HCC)  furosemide (LASIX) 20 MG tablet    lisinopril (PRINIVIL,ZESTRIL) 5 MG tablet       Return in about 6 months (around 2/28/2019).       dyslipidemia-patient is on pravastatin and will like to stop this.  She will start omega-3.  She will get a CMP, lipids and TSH in 6 weeks.  Hypertension-patient doing well.  Smoking-patient encouraged on cessation.  Cardiomyopathy-patient's on diuretic, Ace and beta.  She will continue her medication regimen.  She will follow-up in 6 months or sooner if any changes.  I advised if her cholesterol was elevated meaning her LDL she may have to go back on the statin.  She understands.        Patient's Body mass index is 26.51 kg/m². BMI is within normal parameters. No follow-up required.      Electronically signed by:

## 2018-10-29 DIAGNOSIS — E78.5 DYSLIPIDEMIA: ICD-10-CM

## 2018-10-29 RX ORDER — OMEGA-3 FATTY ACIDS/FISH OIL 300-1000MG
1000 CAPSULE ORAL DAILY
Qty: 30 EACH | Refills: 11 | Status: SHIPPED | OUTPATIENT
Start: 2018-10-29 | End: 2020-05-27 | Stop reason: SDUPTHER

## 2018-11-09 RX ORDER — OMEGA-3-ACID ETHYL ESTERS 1 G/1
1 CAPSULE, LIQUID FILLED ORAL DAILY
Qty: 30 CAPSULE | Refills: 6 | Status: SHIPPED | OUTPATIENT
Start: 2018-11-09 | End: 2019-05-28 | Stop reason: SDUPTHER

## 2019-01-31 DIAGNOSIS — I10 ESSENTIAL HYPERTENSION: ICD-10-CM

## 2019-01-31 DIAGNOSIS — I42.9 CARDIOMYOPATHY, UNSPECIFIED TYPE (HCC): ICD-10-CM

## 2019-01-31 RX ORDER — FUROSEMIDE 20 MG/1
20 TABLET ORAL DAILY
Qty: 90 TABLET | Refills: 2 | Status: SHIPPED | OUTPATIENT
Start: 2019-01-31 | End: 2019-05-28 | Stop reason: SDUPTHER

## 2019-01-31 RX ORDER — METOPROLOL SUCCINATE 25 MG/1
25 TABLET, EXTENDED RELEASE ORAL DAILY
Qty: 90 TABLET | Refills: 2 | Status: SHIPPED | OUTPATIENT
Start: 2019-01-31 | End: 2019-05-28 | Stop reason: SDUPTHER

## 2019-03-11 DIAGNOSIS — I10 ESSENTIAL HYPERTENSION: ICD-10-CM

## 2019-03-11 DIAGNOSIS — I42.9 CARDIOMYOPATHY, UNSPECIFIED TYPE (HCC): ICD-10-CM

## 2019-03-11 RX ORDER — LISINOPRIL 5 MG/1
5 TABLET ORAL DAILY
Qty: 90 TABLET | Refills: 2 | Status: SHIPPED | OUTPATIENT
Start: 2019-03-11 | End: 2019-05-28 | Stop reason: SDUPTHER

## 2019-03-11 NOTE — TELEPHONE ENCOUNTER
Pt called for refill for lisinopril, has f/u apt. May.  Sent to [facility] per pt request.  CHELLY,CMA

## 2019-05-28 ENCOUNTER — OFFICE VISIT (OUTPATIENT)
Dept: CARDIOLOGY | Facility: CLINIC | Age: 74
End: 2019-05-28

## 2019-05-28 VITALS
HEART RATE: 60 BPM | SYSTOLIC BLOOD PRESSURE: 124 MMHG | DIASTOLIC BLOOD PRESSURE: 54 MMHG | HEIGHT: 62 IN | OXYGEN SATURATION: 98 % | BODY MASS INDEX: 25.21 KG/M2 | WEIGHT: 137 LBS

## 2019-05-28 DIAGNOSIS — I42.9 CARDIOMYOPATHY, UNSPECIFIED TYPE (HCC): ICD-10-CM

## 2019-05-28 DIAGNOSIS — I42.0 CARDIOMYOPATHY, DILATED (HCC): ICD-10-CM

## 2019-05-28 DIAGNOSIS — E78.5 DYSLIPIDEMIA: ICD-10-CM

## 2019-05-28 DIAGNOSIS — F17.200 SMOKING: ICD-10-CM

## 2019-05-28 DIAGNOSIS — I10 ESSENTIAL HYPERTENSION: Primary | ICD-10-CM

## 2019-05-28 DIAGNOSIS — R06.02 BREATH SHORTNESS: ICD-10-CM

## 2019-05-28 PROCEDURE — 99213 OFFICE O/P EST LOW 20 MIN: CPT | Performed by: NURSE PRACTITIONER

## 2019-05-28 RX ORDER — ASPIRIN 81 MG/1
81 TABLET ORAL DAILY
Qty: 90 TABLET | Refills: 3 | Status: SHIPPED | OUTPATIENT
Start: 2019-05-28 | End: 2020-05-27 | Stop reason: SDUPTHER

## 2019-05-28 RX ORDER — LISINOPRIL 5 MG/1
5 TABLET ORAL DAILY
Qty: 90 TABLET | Refills: 2 | Status: SHIPPED | OUTPATIENT
Start: 2019-05-28 | End: 2019-11-19 | Stop reason: SDUPTHER

## 2019-05-28 RX ORDER — DIPHENHYDRAMINE HCL 25 MG
CAPSULE ORAL NIGHTLY PRN
COMMUNITY

## 2019-05-28 RX ORDER — METOPROLOL SUCCINATE 25 MG/1
25 TABLET, EXTENDED RELEASE ORAL DAILY
Qty: 90 TABLET | Refills: 2 | Status: SHIPPED | OUTPATIENT
Start: 2019-05-28 | End: 2019-11-11 | Stop reason: SDUPTHER

## 2019-05-28 RX ORDER — FUROSEMIDE 20 MG/1
20 TABLET ORAL DAILY
Qty: 90 TABLET | Refills: 2 | Status: SHIPPED | OUTPATIENT
Start: 2019-05-28 | End: 2019-11-11 | Stop reason: SDUPTHER

## 2019-05-28 NOTE — PROGRESS NOTES
Subjective   Marci Staurt is a 73 y.o. female     Chief Complaint   Patient presents with   • Follow-up     Pt in ice for 6mth follow up    • Hypertension       HPI    Problem List:    1. Dilated cardiomyopathy, EF initially felt to be 30 to 35%.  1.1 Most recent echocardiogram indicated , EF of 40-45%.  1.2 Low risk stress test in work-up for dilated cardiomyopathy.  1.3 Echo 6/21/17-mild LVH, EF 40-45%, diastolic dysfunction 2, moderate AR, mild MR, physiological TR, mild LA.  2. Valvular heart disease   a. Moderate MR, Mild-Mod TR, moderate AI and mild-mod PI  2. Hypertension  3. Dyslipidemia  4. Tobacco Habituation - stopped 8/15/17  5. Carotid Duplex 4/2/15 - 16-49% narrowing to HAZEL and LICA, antegrade flow   5.1 carotid ultrasound 6/21/17-16-49% bilateral internal carotid arteries.  To moderate carotid bifurcation disease bilaterally with no flow limiting stenosis on either side.  Antegrade flow both vertebral arteries.    Patient is a 73-year-old female who presents today for follow-up.  She denies any chest pain, pressure, palpations, fluttering, dizziness, presyncope, syncope, orthopnea or PND.  She says she will get lipid swelling when it is hot out.  She denies any shortness of breath with activity.  She says she is been walking on a treadmill every day however she is staying super busy now so she is not having to.  She is down to half a pack a day.     Current Outpatient Medications   Medication Sig Dispense Refill   • aspirin 81 MG EC tablet Take 1 tablet by mouth Daily. 90 tablet 3   • Calcium Carbonate (CALTRATE 600) 1500 (600 CA) MG tablet Take 1 tablet by mouth Daily.     • CloNIDine (CATAPRES) 0.1 MG tablet Every 8 hours for SBP > 160 or DBP > 90 180 tablet 2   • diphenhydrAMINE HCl (BENADRYL ALLERGY PO) Take  by mouth At Night As Needed.     • furosemide (LASIX) 20 MG tablet Take 1 tablet by mouth Daily. 90 tablet 2   • lisinopril (PRINIVIL,ZESTRIL) 5 MG tablet Take 1 tablet by mouth  Daily. 90 tablet 2   • metoprolol succinate XL (TOPROL-XL) 25 MG 24 hr tablet Take 1 tablet by mouth Daily. 90 tablet 2   • MULTIPLE VITAMIN PO Take  by mouth daily.     • Omega 3 1000 MG capsule Take 1,000 mg by mouth Daily. 30 each 11     No current facility-administered medications for this visit.        ALLERGIES    Patient has no known allergies.    Past Medical History:   Diagnosis Date   • Coronary artery disease    • Hyperlipidemia    • Hypertension        Social History     Socioeconomic History   • Marital status:      Spouse name: Not on file   • Number of children: Not on file   • Years of education: Not on file   • Highest education level: Not on file   Tobacco Use   • Smoking status: Current Every Day Smoker     Packs/day: 0.50     Types: Cigarettes   • Smokeless tobacco: Never Used   • Tobacco comment: LESS THAN 1 PPD   Substance and Sexual Activity   • Alcohol use: No     Comment: A LITTLE WINE   • Drug use: No       Family History   Problem Relation Age of Onset   • Heart attack Father    • Diabetes Other    • Hyperlipidemia Other    • Hypertension Other    • No Known Problems Mother        Review of Systems   Constitutional: Negative for fatigue.   HENT: Positive for sneezing. Negative for congestion, rhinorrhea and sore throat.    Eyes: Positive for visual disturbance (glasses daily ).   Respiratory: Negative for chest tightness and shortness of breath.    Cardiovascular: Positive for leg swelling (BLE edema when it's hot out ). Negative for chest pain (Denies CP ) and palpitations.   Gastrointestinal: Negative for abdominal pain, constipation, diarrhea, nausea and vomiting.   Endocrine: Positive for cold intolerance (back and forth ) and heat intolerance (back and forth ).   Genitourinary: Negative for difficulty urinating, dysuria, frequency, hematuria and urgency.   Musculoskeletal: Positive for arthralgias. Negative for back pain and neck pain.   Skin: Negative for rash and wound.  "  Allergic/Immunologic: Positive for environmental allergies (seasonal ). Negative for food allergies.   Neurological: Negative for dizziness, syncope, weakness, light-headedness, numbness and headaches.   Hematological: Bruises/bleeds easily (bruises).   Psychiatric/Behavioral: Negative for sleep disturbance.       Objective   /54   Pulse 60   Ht 157.5 cm (62\")   Wt 62.1 kg (137 lb)   SpO2 98%   BMI 25.06 kg/m²   Vitals:    05/28/19 1335   BP: 124/54   Pulse: 60   SpO2: 98%   Weight: 62.1 kg (137 lb)   Height: 157.5 cm (62\")      Lab Results (most recent)     None        Physical Exam   Constitutional: She is oriented to person, place, and time. Vital signs are normal. She appears well-developed and well-nourished. She is active and cooperative.   HENT:   Head: Normocephalic.   Eyes: Lids are normal.   Wears glasses    Neck: Normal carotid pulses, no hepatojugular reflux and no JVD present. Carotid bruit is not present.   Cardiovascular: Normal rate and regular rhythm.   Murmur heard.   Systolic (RUSB and LUSB 1-2/6) murmur is present.  Pulses:       Radial pulses are 2+ on the right side, and 2+ on the left side.        Dorsalis pedis pulses are 2+ on the right side, and 2+ on the left side.        Posterior tibial pulses are 2+ on the right side, and 2+ on the left side.   No edema BLE.    Pulmonary/Chest: Effort normal and breath sounds normal.   Abdominal: Normal appearance and bowel sounds are normal.   Neurological: She is alert and oriented to person, place, and time.   Skin: Skin is warm, dry and intact. Bruising noted.   Psychiatric: She has a normal mood and affect. Her speech is normal and behavior is normal. Judgment and thought content normal. Cognition and memory are normal.       Procedure   Procedures         Assessment/Plan      Diagnosis Plan   1. Essential hypertension  lisinopril (PRINIVIL,ZESTRIL) 5 MG tablet    metoprolol succinate XL (TOPROL-XL) 25 MG 24 hr tablet    aspirin 81 MG " EC tablet   2. Cardiomyopathy, dilated (CMS/HCC)  aspirin 81 MG EC tablet   3. Breath shortness     4. Dyslipidemia     5. Smoking     6. Cardiomyopathy, unspecified type (CMS/HCC)  lisinopril (PRINIVIL,ZESTRIL) 5 MG tablet    furosemide (LASIX) 20 MG tablet       Return in about 6 months (around 11/28/2019).    Hypertension-patient is doing very well on the Toprol lisinopril.  Cardiomyopathy-patient's on beta-blocker, ACE and diuretic.  Shortness of breath-resolved.  Dyslipidemia-patient is taking just omega-3 fatty acids.  Smoking-encourage cessation.  She will continue her medication regimen.  She will follow-up in 6 months or sooner if any changes.       I advised Marci of the risks of continuing to use tobacco, and I provided her with tobacco cessation educational materials in the After Visit Summary.     During this visit, I spent <3 minutes counseling the patient regarding tobacco cessation.    Patient's Body mass index is 25.06 kg/m². BMI is within normal parameters. No follow-up required..      Electronically signed by:

## 2019-05-28 NOTE — PATIENT INSTRUCTIONS
Steps to Quit Smoking  Smoking tobacco can be bad for your health. It can also affect almost every organ in your body. Smoking puts you and people around you at risk for many serious long-lasting (chronic) diseases. Quitting smoking is hard, but it is one of the best things that you can do for your health. It is never too late to quit.  What are the benefits of quitting smoking?  When you quit smoking, you lower your risk for getting serious diseases and conditions. They can include:  · Lung cancer or lung disease.  · Heart disease.  · Stroke.  · Heart attack.  · Not being able to have children (infertility).  · Weak bones (osteoporosis) and broken bones (fractures).    If you have coughing, wheezing, and shortness of breath, those symptoms may get better when you quit. You may also get sick less often. If you are pregnant, quitting smoking can help to lower your chances of having a baby of low birth weight.  What can I do to help me quit smoking?  Talk with your doctor about what can help you quit smoking. Some things you can do (strategies) include:  · Quitting smoking totally, instead of slowly cutting back how much you smoke over a period of time.  · Going to in-person counseling. You are more likely to quit if you go to many counseling sessions.  · Using resources and support systems, such as:  ? Online chats with a counselor.  ? Phone quitlines.  ? Printed self-help materials.  ? Support groups or group counseling.  ? Text messaging programs.  ? Mobile phone apps or applications.  · Taking medicines. Some of these medicines may have nicotine in them. If you are pregnant or breastfeeding, do not take any medicines to quit smoking unless your doctor says it is okay. Talk with your doctor about counseling or other things that can help you.    Talk with your doctor about using more than one strategy at the same time, such as taking medicines while you are also going to in-person counseling. This can help make  quitting easier.  What things can I do to make it easier to quit?  Quitting smoking might feel very hard at first, but there is a lot that you can do to make it easier. Take these steps:  · Talk to your family and friends. Ask them to support and encourage you.  · Call phone quitlines, reach out to support groups, or work with a counselor.  · Ask people who smoke to not smoke around you.  · Avoid places that make you want (trigger) to smoke, such as:  ? Bars.  ? Parties.  ? Smoke-break areas at work.  · Spend time with people who do not smoke.  · Lower the stress in your life. Stress can make you want to smoke. Try these things to help your stress:  ? Getting regular exercise.  ? Deep-breathing exercises.  ? Yoga.  ? Meditating.  ? Doing a body scan. To do this, close your eyes, focus on one area of your body at a time from head to toe, and notice which parts of your body are tense. Try to relax the muscles in those areas.  · Download or buy apps on your mobile phone or tablet that can help you stick to your quit plan. There are many free apps, such as QuitGuide from the CDC (Centers for Disease Control and Prevention). You can find more support from smokefree.gov and other websites.    This information is not intended to replace advice given to you by your health care provider. Make sure you discuss any questions you have with your health care provider.  Document Released: 10/14/2010 Document Revised: 08/15/2017 Document Reviewed: 05/03/2016  [employer] Patient Education © 2019 [employer]

## 2019-11-11 DIAGNOSIS — I10 ESSENTIAL HYPERTENSION: ICD-10-CM

## 2019-11-11 DIAGNOSIS — I42.9 CARDIOMYOPATHY, UNSPECIFIED TYPE (HCC): ICD-10-CM

## 2019-11-11 RX ORDER — METOPROLOL SUCCINATE 25 MG/1
25 TABLET, EXTENDED RELEASE ORAL DAILY
Qty: 90 TABLET | Refills: 2 | Status: SHIPPED | OUTPATIENT
Start: 2019-11-11 | End: 2020-05-27 | Stop reason: SDUPTHER

## 2019-11-11 RX ORDER — FUROSEMIDE 20 MG/1
20 TABLET ORAL DAILY
Qty: 90 TABLET | Refills: 2 | Status: SHIPPED | OUTPATIENT
Start: 2019-11-11 | End: 2020-05-27 | Stop reason: SDUPTHER

## 2019-11-11 NOTE — TELEPHONE ENCOUNTER
Pt called requesting a refill on Metoprolol 25 mg and water pill. Pt has an appt scheduled 11/19/19 @ 1:45pm with Sonali Colbret. Pt requests meds to be sent to [facility].

## 2019-11-19 ENCOUNTER — OFFICE VISIT (OUTPATIENT)
Dept: CARDIOLOGY | Facility: CLINIC | Age: 74
End: 2019-11-19

## 2019-11-19 VITALS
OXYGEN SATURATION: 96 % | SYSTOLIC BLOOD PRESSURE: 145 MMHG | HEART RATE: 66 BPM | BODY MASS INDEX: 24.51 KG/M2 | DIASTOLIC BLOOD PRESSURE: 72 MMHG | WEIGHT: 133.2 LBS | HEIGHT: 62 IN

## 2019-11-19 DIAGNOSIS — I42.0 CARDIOMYOPATHY, DILATED (HCC): ICD-10-CM

## 2019-11-19 DIAGNOSIS — I10 ESSENTIAL HYPERTENSION: Primary | ICD-10-CM

## 2019-11-19 DIAGNOSIS — I42.9 CARDIOMYOPATHY, UNSPECIFIED TYPE (HCC): ICD-10-CM

## 2019-11-19 DIAGNOSIS — E78.5 DYSLIPIDEMIA: ICD-10-CM

## 2019-11-19 DIAGNOSIS — F17.200 SMOKING: ICD-10-CM

## 2019-11-19 DIAGNOSIS — I65.23 BILATERAL CAROTID ARTERY STENOSIS: ICD-10-CM

## 2019-11-19 PROCEDURE — 99406 BEHAV CHNG SMOKING 3-10 MIN: CPT | Performed by: NURSE PRACTITIONER

## 2019-11-19 PROCEDURE — 99214 OFFICE O/P EST MOD 30 MIN: CPT | Performed by: NURSE PRACTITIONER

## 2019-11-19 PROCEDURE — 93000 ELECTROCARDIOGRAM COMPLETE: CPT | Performed by: NURSE PRACTITIONER

## 2019-11-19 RX ORDER — LISINOPRIL 5 MG/1
5 TABLET ORAL DAILY
Qty: 90 TABLET | Refills: 3 | Status: SHIPPED | OUTPATIENT
Start: 2019-11-19 | End: 2020-05-27 | Stop reason: SDUPTHER

## 2019-11-19 NOTE — PROGRESS NOTES
Subjective   Marci Staurt is a 74 y.o. female     Chief Complaint   Patient presents with   • Follow-up   • Hypertension       HPI    Problem List:    1. Dilated cardiomyopathy, EF initially felt to be 30 to 35%.  1.1 Most recent echocardiogram indicated , EF of 40-45%.  1.2 Low risk stress test in work-up for dilated cardiomyopathy.  1.3 Echo 6/21/17-mild LVH, EF 40-45%, diastolic dysfunction 2, moderate AR, mild MR, physiological TR, mild MS.  2. Valvular heart disease   a. Moderate MR, Mild-Mod TR, moderate AI and mild-mod PI  2. Hypertension  3. Dyslipidemia  4. Tobacco Habituation - stopped 8/15/17  5. Carotid Duplex 4/2/15 - 16-49% narrowing to HAZEL and LICA, antegrade flow   5.1 carotid ultrasound 6/21/17-16-49% bilateral internal carotid arteries.  To moderate carotid bifurcation disease bilaterally with no flow limiting stenosis on either side.  Antegrade flow both vertebral arteries.    Patient is a 74-year-old female who presents today for follow-up.  She denies any chest pain, pressure, palpitations, fluttering, dizziness, presyncope, syncope, orthopnea or PND.  She says she does get some swelling but typically will go down overnight and is only use if she is been on her feet a lot.  She denies any shortness of breath with activity.  She says she sometimes will get fatigue with activity but not always.  She is still smoking just under a pack a day.  Her brother recently had 18 inches of colon removed for colon cancer but is doing very well.  Patient's cholesterol is been good.    Current Outpatient Medications on File Prior to Visit   Medication Sig Dispense Refill   • aspirin 81 MG EC tablet Take 1 tablet by mouth Daily. 90 tablet 3   • Calcium Carbonate (CALTRATE 600) 1500 (600 CA) MG tablet Take 1 tablet by mouth Daily.     • CloNIDine (CATAPRES) 0.1 MG tablet Every 8 hours for SBP > 160 or DBP > 90 180 tablet 2   • diphenhydrAMINE HCl (BENADRYL ALLERGY PO) Take  by mouth At Night As Needed.    "  • furosemide (LASIX) 20 MG tablet Take 1 tablet by mouth Daily. 90 tablet 2   • metoprolol succinate XL (TOPROL-XL) 25 MG 24 hr tablet Take 1 tablet by mouth Daily. 90 tablet 2   • MULTIPLE VITAMIN PO Take  by mouth daily.     • Omega 3 1000 MG capsule Take 1,000 mg by mouth Daily. 30 each 11   • [DISCONTINUED] lisinopril (PRINIVIL,ZESTRIL) 5 MG tablet Take 1 tablet by mouth Daily. 90 tablet 2     No current facility-administered medications on file prior to visit.        ALLERGIES    Patient has no known allergies.    Past Medical History:   Diagnosis Date   • Coronary artery disease    • Hyperlipidemia    • Hypertension        Social History     Socioeconomic History   • Marital status:      Spouse name: Not on file   • Number of children: Not on file   • Years of education: Not on file   • Highest education level: Not on file   Tobacco Use   • Smoking status: Current Every Day Smoker     Packs/day: 1.00     Years: 39.00     Pack years: 39.00     Types: Cigarettes   • Smokeless tobacco: Never Used   Substance and Sexual Activity   • Alcohol use: No     Comment: A LITTLE WINE   • Drug use: No       Family History   Problem Relation Age of Onset   • Heart attack Father    • Diabetes Other    • Hyperlipidemia Other    • Hypertension Other    • No Known Problems Mother        Review of Systems   Constitutional: Positive for fatigue (Sometimes w/ increased activity ). Negative for diaphoresis.   HENT: Positive for nosebleeds (\"a little bit, not much\" ) and rhinorrhea (allergies ). Negative for congestion and sore throat.    Eyes: Positive for visual disturbance (  glasses daily ).   Respiratory: Negative for chest tightness and shortness of breath.    Cardiovascular: Positive for leg swelling (States she has BLE edema when she's up on her feet a lot, goes down overnight ). Negative for chest pain (Denies CP ) and palpitations.   Gastrointestinal: Positive for diarrhea. Negative for abdominal pain, blood in " "stool, constipation, nausea and vomiting.   Endocrine: Negative for cold intolerance and heat intolerance.   Genitourinary: Negative for difficulty urinating, dysuria, frequency, hematuria and urgency.   Musculoskeletal: Positive for arthralgias (hands ). Negative for back pain and neck pain.   Skin: Negative for rash and wound.   Allergic/Immunologic: Positive for environmental allergies (seasonal ). Negative for food allergies.   Neurological: Negative for dizziness, syncope, weakness, light-headedness, numbness and headaches.   Hematological: Bruises/bleeds easily (bruises).   Psychiatric/Behavioral: Negative for sleep disturbance.       Objective   /72   Pulse 66   Ht 157.5 cm (62\")   Wt 60.4 kg (133 lb 3.2 oz)   SpO2 96%   BMI 24.36 kg/m²   Vitals:    11/19/19 1308   BP: 145/72   Pulse: 66   SpO2: 96%   Weight: 60.4 kg (133 lb 3.2 oz)   Height: 157.5 cm (62\")      Lab Results (most recent)     None        Physical Exam   Constitutional: She is oriented to person, place, and time. Vital signs are normal. She appears well-developed and well-nourished. She is active and cooperative.   HENT:   Head: Normocephalic.   Eyes: Lids are normal.   Neck: Normal carotid pulses, no hepatojugular reflux and no JVD present. Carotid bruit is not present.   Cardiovascular: Normal rate and regular rhythm.   Murmur heard.   Systolic (RUSB and LUSB 1-2/6) murmur is present.  Pulses:       Radial pulses are 2+ on the right side, and 2+ on the left side.        Dorsalis pedis pulses are 2+ on the right side, and 2+ on the left side.        Posterior tibial pulses are 2+ on the right side, and 2+ on the left side.   No edema BLE.   Pulmonary/Chest: Effort normal and breath sounds normal.   Abdominal: Normal appearance and bowel sounds are normal.   Neurological: She is alert and oriented to person, place, and time.   Skin: Skin is warm, dry and intact.   Psychiatric: She has a normal mood and affect. Her speech is normal " and behavior is normal. Judgment and thought content normal. Cognition and memory are normal.       Procedure     ECG 12 Lead  Date/Time: 11/19/2019 1:30 PM  Performed by: Sonali Colbert APRN  Authorized by: Sonali Colbert APRN   Comparison: compared with previous ECG from 8/30/2018  Rhythm: sinus rhythm and sinus arrhythmia  Rate: normal  BPM: 64  Conduction: non-specific intraventricular conduction delay  ST Depression: V6  QRS axis: left  Other findings: non-specific ST-T wave changes    Clinical impression: abnormal EKG                 Assessment/Plan      Diagnosis Plan   1. Essential hypertension  lisinopril (PRINIVIL,ZESTRIL) 5 MG tablet    ECG 12 Lead    Adult Transthoracic Echo Complete W/ Cont if Necessary Per Protocol   2. Bilateral carotid artery stenosis  Adult Transthoracic Echo Complete W/ Cont if Necessary Per Protocol    Duplex Carotid Ultrasound CAR   3. Cardiomyopathy, dilated (CMS/HCC)  Adult Transthoracic Echo Complete W/ Cont if Necessary Per Protocol   4. Dyslipidemia  Adult Transthoracic Echo Complete W/ Cont if Necessary Per Protocol   5. Smoking  Adult Transthoracic Echo Complete W/ Cont if Necessary Per Protocol   6. Cardiomyopathy, unspecified type (CMS/HCC)  lisinopril (PRINIVIL,ZESTRIL) 5 MG tablet    Adult Transthoracic Echo Complete W/ Cont if Necessary Per Protocol       Return in about 6 months (around 5/19/2020).    Hypertension/carotid artery disease/cardiomyopathy/shortness of lipidemia/smoking-patient will have repeat echocardiogram but she would like to wait until May.  Carotid artery disease-patient will have repeat carotid artery ultrasound but again she wants to wait until May.  She will continue her medication regimen otherwise.  She will follow-up in 6 months or sooner if any changes.  Patient says her blood pressure has been doing excellent she is not sure why is elevated today.  She says it could have just been the drive over.        Marci Stuart is a current  cigarettes user.  She currently smokes >1 pack of cigarettes per day for a duration of 40 years. I have educated her on the risk of diseases from using tobacco products such as cancer, COPD and heart diease.     I advised her to quit and she is willing to quit. We have discussed the following method/s for tobacco cessation:  Education Material.  States she wants patches, but insurance won't cover them.     I spent 3  minutes counseling the patient.        Patient's Body mass index is 24.36 kg/m². BMI is within normal parameters. No follow-up required..      Electronically signed by:

## 2019-11-19 NOTE — PATIENT INSTRUCTIONS
Steps to Quit Smoking    Smoking tobacco can be bad for your health. It can also affect almost every organ in your body. Smoking puts you and people around you at risk for many serious long-lasting (chronic) diseases. Quitting smoking is hard, but it is one of the best things that you can do for your health. It is never too late to quit.  What are the benefits of quitting smoking?  When you quit smoking, you lower your risk for getting serious diseases and conditions. They can include:  · Lung cancer or lung disease.  · Heart disease.  · Stroke.  · Heart attack.  · Not being able to have children (infertility).  · Weak bones (osteoporosis) and broken bones (fractures).  If you have coughing, wheezing, and shortness of breath, those symptoms may get better when you quit. You may also get sick less often. If you are pregnant, quitting smoking can help to lower your chances of having a baby of low birth weight.  What can I do to help me quit smoking?  Talk with your doctor about what can help you quit smoking. Some things you can do (strategies) include:  · Quitting smoking totally, instead of slowly cutting back how much you smoke over a period of time.  · Going to in-person counseling. You are more likely to quit if you go to many counseling sessions.  · Using resources and support systems, such as:  ? Online chats with a counselor.  ? Phone quitlines.  ? Printed self-help materials.  ? Support groups or group counseling.  ? Text messaging programs.  ? Mobile phone apps or applications.  · Taking medicines. Some of these medicines may have nicotine in them. If you are pregnant or breastfeeding, do not take any medicines to quit smoking unless your doctor says it is okay. Talk with your doctor about counseling or other things that can help you.  Talk with your doctor about using more than one strategy at the same time, such as taking medicines while you are also going to in-person counseling. This can help make  quitting easier.  What things can I do to make it easier to quit?  Quitting smoking might feel very hard at first, but there is a lot that you can do to make it easier. Take these steps:  · Talk to your family and friends. Ask them to support and encourage you.  · Call phone quitlines, reach out to support groups, or work with a counselor.  · Ask people who smoke to not smoke around you.  · Avoid places that make you want (trigger) to smoke, such as:  ? Bars.  ? Parties.  ? Smoke-break areas at work.  · Spend time with people who do not smoke.  · Lower the stress in your life. Stress can make you want to smoke. Try these things to help your stress:  ? Getting regular exercise.  ? Deep-breathing exercises.  ? Yoga.  ? Meditating.  ? Doing a body scan. To do this, close your eyes, focus on one area of your body at a time from head to toe, and notice which parts of your body are tense. Try to relax the muscles in those areas.  · Download or buy apps on your mobile phone or tablet that can help you stick to your quit plan. There are many free apps, such as QuitGuide from the CDC (Centers for Disease Control and Prevention). You can find more support from smokefree.gov and other websites.  This information is not intended to replace advice given to you by your health care provider. Make sure you discuss any questions you have with your health care provider.  Document Released: 10/14/2010 Document Revised: 08/15/2017 Document Reviewed: 05/03/2016  [employer] Interactive Patient Education © 2019 [employer]

## 2019-12-09 DIAGNOSIS — I10 ESSENTIAL HYPERTENSION: ICD-10-CM

## 2019-12-09 DIAGNOSIS — I42.9 CARDIOMYOPATHY, UNSPECIFIED TYPE (HCC): ICD-10-CM

## 2019-12-09 RX ORDER — LISINOPRIL 5 MG/1
TABLET ORAL
Qty: 90 TABLET | Refills: 1 | OUTPATIENT
Start: 2019-12-09

## 2020-05-19 ENCOUNTER — HOSPITAL ENCOUNTER (OUTPATIENT)
Dept: CARDIOLOGY | Facility: HOSPITAL | Age: 75
Discharge: HOME | End: 2020-05-19

## 2020-05-19 ENCOUNTER — HOSPITAL ENCOUNTER (OUTPATIENT)
Dept: CARDIOLOGY | Facility: HOSPITAL | Age: 75
Discharge: HOME | End: 2020-05-19
Admitting: NURSE PRACTITIONER

## 2020-05-19 VITALS — HEIGHT: 62 IN | WEIGHT: 133.16 LBS | BODY MASS INDEX: 24.5 KG/M2

## 2020-05-19 DIAGNOSIS — E78.5 DYSLIPIDEMIA: ICD-10-CM

## 2020-05-19 DIAGNOSIS — I42.9 CARDIOMYOPATHY, UNSPECIFIED TYPE (HCC): ICD-10-CM

## 2020-05-19 DIAGNOSIS — I42.0 CARDIOMYOPATHY, DILATED (HCC): ICD-10-CM

## 2020-05-19 DIAGNOSIS — F17.200 SMOKING: ICD-10-CM

## 2020-05-19 DIAGNOSIS — I65.23 BILATERAL CAROTID ARTERY STENOSIS: ICD-10-CM

## 2020-05-19 DIAGNOSIS — I10 ESSENTIAL HYPERTENSION: ICD-10-CM

## 2020-05-19 PROCEDURE — 93880 EXTRACRANIAL BILAT STUDY: CPT | Performed by: INTERNAL MEDICINE

## 2020-05-19 PROCEDURE — 93306 TTE W/DOPPLER COMPLETE: CPT

## 2020-05-19 PROCEDURE — 93306 TTE W/DOPPLER COMPLETE: CPT | Performed by: INTERNAL MEDICINE

## 2020-05-19 PROCEDURE — 93880 EXTRACRANIAL BILAT STUDY: CPT

## 2020-05-26 ENCOUNTER — TELEPHONE (OUTPATIENT)
Dept: CARDIOLOGY | Facility: CLINIC | Age: 75
End: 2020-05-26

## 2020-05-26 DIAGNOSIS — I65.21 CAROTID ARTERY STENOSIS, SYMPTOMATIC, RIGHT: Primary | ICD-10-CM

## 2020-05-26 LAB
BH CV ECHO MEAS - ACS: 1.7 CM
BH CV ECHO MEAS - AI DEC SLOPE: 169.5 CM/SEC^2
BH CV ECHO MEAS - AI MAX PG: 51.1 MMHG
BH CV ECHO MEAS - AI MAX VEL: 357.5 CM/SEC
BH CV ECHO MEAS - AI P1/2T: 617.8 MSEC
BH CV ECHO MEAS - AO MAX PG (FULL): 4 MMHG
BH CV ECHO MEAS - AO MAX PG: 12.3 MMHG
BH CV ECHO MEAS - AO MEAN PG (FULL): 3 MMHG
BH CV ECHO MEAS - AO MEAN PG: 7 MMHG
BH CV ECHO MEAS - AO ROOT AREA (BSA CORRECTED): 1.8
BH CV ECHO MEAS - AO ROOT AREA: 6.6 CM^2
BH CV ECHO MEAS - AO ROOT DIAM: 2.9 CM
BH CV ECHO MEAS - AO V2 MAX: 175 CM/SEC
BH CV ECHO MEAS - AO V2 MEAN: 120 CM/SEC
BH CV ECHO MEAS - AO V2 VTI: 46.6 CM
BH CV ECHO MEAS - BSA(HAYCOCK): 1.6 M^2
BH CV ECHO MEAS - BSA(HAYCOCK): 1.6 M^2
BH CV ECHO MEAS - BSA: 1.6 M^2
BH CV ECHO MEAS - BSA: 1.6 M^2
BH CV ECHO MEAS - BZI_BMI: 24.3 KILOGRAMS/M^2
BH CV ECHO MEAS - BZI_BMI: 24.3 KILOGRAMS/M^2
BH CV ECHO MEAS - BZI_METRIC_HEIGHT: 157.5 CM
BH CV ECHO MEAS - BZI_METRIC_HEIGHT: 157.5 CM
BH CV ECHO MEAS - BZI_METRIC_WEIGHT: 60.3 KG
BH CV ECHO MEAS - BZI_METRIC_WEIGHT: 60.3 KG
BH CV ECHO MEAS - EDV(CUBED): 80.6 ML
BH CV ECHO MEAS - EDV(TEICH): 84 ML
BH CV ECHO MEAS - EF(CUBED): 59 %
BH CV ECHO MEAS - EF(TEICH): 50.9 %
BH CV ECHO MEAS - ESV(CUBED): 33.1 ML
BH CV ECHO MEAS - ESV(TEICH): 41.3 ML
BH CV ECHO MEAS - FS: 25.7 %
BH CV ECHO MEAS - IVS/LVPW: 0.88
BH CV ECHO MEAS - IVSD: 0.84 CM
BH CV ECHO MEAS - LA DIMENSION: 3.7 CM
BH CV ECHO MEAS - LA/AO: 1.3
BH CV ECHO MEAS - LAT PEAK E' VEL: 10.7 CM/SEC
BH CV ECHO MEAS - LV IVRT: 0.14 SEC
BH CV ECHO MEAS - LV MASS(C)D: 124 GRAMS
BH CV ECHO MEAS - LV MASS(C)DI: 77.2 GRAMS/M^2
BH CV ECHO MEAS - LV MAX PG: 8.3 MMHG
BH CV ECHO MEAS - LV MEAN PG: 4 MMHG
BH CV ECHO MEAS - LV V1 MAX: 144 CM/SEC
BH CV ECHO MEAS - LV V1 MEAN: 85.4 CM/SEC
BH CV ECHO MEAS - LV V1 VTI: 29.2 CM
BH CV ECHO MEAS - LVIDD: 4.3 CM
BH CV ECHO MEAS - LVIDS: 3.2 CM
BH CV ECHO MEAS - LVPWD: 0.95 CM
BH CV ECHO MEAS - MED PEAK E' VEL: 6.1 CM/SEC
BH CV ECHO MEAS - MV A MAX VEL: 113 CM/SEC
BH CV ECHO MEAS - MV DEC SLOPE: 408 CM/SEC^2
BH CV ECHO MEAS - MV DEC TIME: 0.26 SEC
BH CV ECHO MEAS - MV E MAX VEL: 83.5 CM/SEC
BH CV ECHO MEAS - MV E/A: 0.74
BH CV ECHO MEAS - MV MAX PG: 5.4 MMHG
BH CV ECHO MEAS - MV MEAN PG: 2 MMHG
BH CV ECHO MEAS - MV P1/2T MAX VEL: 87.9 CM/SEC
BH CV ECHO MEAS - MV P1/2T: 63.1 MSEC
BH CV ECHO MEAS - MV V2 MAX: 116 CM/SEC
BH CV ECHO MEAS - MV V2 MEAN: 66 CM/SEC
BH CV ECHO MEAS - MV V2 VTI: 36 CM
BH CV ECHO MEAS - MVA P1/2T LCG: 2.5 CM^2
BH CV ECHO MEAS - MVA(P1/2T): 3.5 CM^2
BH CV ECHO MEAS - PA MAX PG (FULL): 1 MMHG
BH CV ECHO MEAS - PA MAX PG: 4.5 MMHG
BH CV ECHO MEAS - PA MEAN PG (FULL): 0 MMHG
BH CV ECHO MEAS - PA MEAN PG: 2 MMHG
BH CV ECHO MEAS - PA V2 MAX: 106 CM/SEC
BH CV ECHO MEAS - PA V2 MEAN: 65.2 CM/SEC
BH CV ECHO MEAS - PA V2 VTI: 22.7 CM
BH CV ECHO MEAS - PI END-D VEL: 78.2 CM/SEC
BH CV ECHO MEAS - RAP SYSTOLE: 10 MMHG
BH CV ECHO MEAS - RV MAX PG: 3.5 MMHG
BH CV ECHO MEAS - RV MEAN PG: 2 MMHG
BH CV ECHO MEAS - RV V1 MAX: 93.3 CM/SEC
BH CV ECHO MEAS - RV V1 MEAN: 59.7 CM/SEC
BH CV ECHO MEAS - RV V1 VTI: 21.4 CM
BH CV ECHO MEAS - RVDD: 2.6 CM
BH CV ECHO MEAS - RVSP: 15 MMHG
BH CV ECHO MEAS - SI(AO): 191.5 ML/M^2
BH CV ECHO MEAS - SI(CUBED): 29.6 ML/M^2
BH CV ECHO MEAS - SI(TEICH): 26.6 ML/M^2
BH CV ECHO MEAS - SV(AO): 307.8 ML
BH CV ECHO MEAS - SV(CUBED): 47.5 ML
BH CV ECHO MEAS - SV(TEICH): 42.7 ML
BH CV ECHO MEAS - TR MAX VEL: 105 CM/SEC
BH CV ECHO MEASUREMENTS AVERAGE E/E' RATIO: 9.94
BH CV XLRA MEAS LEFT BULB EDV: 22 CM/SEC
BH CV XLRA MEAS LEFT BULB PSV: 115 CM/SEC
BH CV XLRA MEAS LEFT CCA RATIO VEL: 82.1 CM/SEC
BH CV XLRA MEAS LEFT DIST CCA EDV: 19.1 CM/SEC
BH CV XLRA MEAS LEFT DIST CCA PSV: 82.1 CM/SEC
BH CV XLRA MEAS LEFT DIST ICA EDV: -48.3 CM/SEC
BH CV XLRA MEAS LEFT DIST ICA PSV: -120.1 CM/SEC
BH CV XLRA MEAS LEFT ICA RATIO VEL: -213 CM/SEC
BH CV XLRA MEAS LEFT ICA/CCA RATIO: -2.6
BH CV XLRA MEAS LEFT MID ICA EDV: -62.9 CM/SEC
BH CV XLRA MEAS LEFT MID ICA PSV: -214.4 CM/SEC
BH CV XLRA MEAS LEFT PROX CCA EDV: 19.8 CM/SEC
BH CV XLRA MEAS LEFT PROX CCA PSV: 122 CM/SEC
BH CV XLRA MEAS LEFT PROX ECA EDV: 0 CM/SEC
BH CV XLRA MEAS LEFT PROX ECA PSV: -168 CM/SEC
BH CV XLRA MEAS LEFT PROX ICA EDV: -42.8 CM/SEC
BH CV XLRA MEAS LEFT PROX ICA PSV: -135.3 CM/SEC
BH CV XLRA MEAS LEFT VERTEBRAL A EDV: 10.5 CM/SEC
BH CV XLRA MEAS LEFT VERTEBRAL A PSV: 63 CM/SEC
BH CV XLRA MEAS RIGHT BULB EDV: -16.9 CM/SEC
BH CV XLRA MEAS RIGHT BULB PSV: -97.7 CM/SEC
BH CV XLRA MEAS RIGHT CCA RATIO VEL: -63.3 CM/SEC
BH CV XLRA MEAS RIGHT DIST CCA EDV: -9.1 CM/SEC
BH CV XLRA MEAS RIGHT DIST CCA PSV: -63.3 CM/SEC
BH CV XLRA MEAS RIGHT DIST ICA EDV: -30.6 CM/SEC
BH CV XLRA MEAS RIGHT DIST ICA PSV: -96.6 CM/SEC
BH CV XLRA MEAS RIGHT ICA RATIO VEL: -238 CM/SEC
BH CV XLRA MEAS RIGHT ICA/CCA RATIO: 3.8
BH CV XLRA MEAS RIGHT MID ICA EDV: -46 CM/SEC
BH CV XLRA MEAS RIGHT MID ICA PSV: -238 CM/SEC
BH CV XLRA MEAS RIGHT PROX CCA EDV: 12.1 CM/SEC
BH CV XLRA MEAS RIGHT PROX CCA PSV: 53.7 CM/SEC
BH CV XLRA MEAS RIGHT PROX ECA EDV: 0 CM/SEC
BH CV XLRA MEAS RIGHT PROX ECA PSV: -144 CM/SEC
BH CV XLRA MEAS RIGHT PROX ICA EDV: 41.4 CM/SEC
BH CV XLRA MEAS RIGHT PROX ICA PSV: 215 CM/SEC
BH CV XLRA MEAS RIGHT VERTEBRAL A EDV: 0 CM/SEC
BH CV XLRA MEAS RIGHT VERTEBRAL A PSV: -55.9 CM/SEC
MAXIMAL PREDICTED HEART RATE: 146 BPM
STRESS TARGET HR: 124 BPM

## 2020-05-26 NOTE — TELEPHONE ENCOUNTER
Duplex Carotid US:  1.  Both common carotid arteries are widely patent.     2.  There is moderate bifurcation disease bilaterally with 16 to 49% stenosis by both echo and Doppler parameters.     3.  There is relatively discrete 50 to 75% stenosis in the proximal right internal carotid artery with 50% or lesser stenosis in the proximal and mid portions of the left internal carotid artery.     4.  Antegrade flow in both vertebral arteries.     Summary: Potentially hemodynamically significant stenosis in the proximal right internal carotid artery and (less likely) the mid left internal carotid artery.  Antegrade vertebral flow bilaterally.      First attempt to reach pt. No answer and no option to LVM.

## 2020-05-26 NOTE — TELEPHONE ENCOUNTER
----- Message from KOFI Thomas sent at 5/26/2020  2:37 PM EDT -----  Please advise patient and order CTA of carotids.  Have her get BMP day of at hospital as well.  She needs to be on a statin, on Pravastatin before.  Is she willing to take Atorvastatin 40 mg PO nightly? If so send in and get lipids and CMP in 6 weeks or if she does not want to take Atorvastatin then send in Rouvastatin 40 mg PO nightly and labs in 6 weeks.

## 2020-05-27 ENCOUNTER — OFFICE VISIT (OUTPATIENT)
Dept: CARDIOLOGY | Facility: CLINIC | Age: 75
End: 2020-05-27

## 2020-05-27 VITALS
OXYGEN SATURATION: 97 % | BODY MASS INDEX: 25.95 KG/M2 | WEIGHT: 141 LBS | HEART RATE: 91 BPM | DIASTOLIC BLOOD PRESSURE: 68 MMHG | HEIGHT: 62 IN | TEMPERATURE: 97.3 F | SYSTOLIC BLOOD PRESSURE: 117 MMHG

## 2020-05-27 DIAGNOSIS — I42.9 CARDIOMYOPATHY, UNSPECIFIED TYPE (HCC): ICD-10-CM

## 2020-05-27 DIAGNOSIS — E78.5 HYPERLIPIDEMIA, UNSPECIFIED HYPERLIPIDEMIA TYPE: ICD-10-CM

## 2020-05-27 DIAGNOSIS — E78.5 DYSLIPIDEMIA: ICD-10-CM

## 2020-05-27 DIAGNOSIS — Z00.00 HEALTHCARE MAINTENANCE: ICD-10-CM

## 2020-05-27 DIAGNOSIS — I65.21 STENOSIS OF RIGHT CAROTID ARTERY: Primary | ICD-10-CM

## 2020-05-27 DIAGNOSIS — I10 ESSENTIAL HYPERTENSION: ICD-10-CM

## 2020-05-27 DIAGNOSIS — I51.89 GRADE II DIASTOLIC DYSFUNCTION: ICD-10-CM

## 2020-05-27 DIAGNOSIS — R60.9 PERIPHERAL EDEMA: ICD-10-CM

## 2020-05-27 DIAGNOSIS — I65.23 BILATERAL CAROTID ARTERY STENOSIS: ICD-10-CM

## 2020-05-27 DIAGNOSIS — I42.0 CARDIOMYOPATHY, DILATED (HCC): Primary | ICD-10-CM

## 2020-05-27 DIAGNOSIS — F17.200 SMOKING: ICD-10-CM

## 2020-05-27 DIAGNOSIS — I65.21 STENOSIS OF RIGHT CAROTID ARTERY: ICD-10-CM

## 2020-05-27 DIAGNOSIS — R93.89 ABNORMAL CAROTID ULTRASOUND: ICD-10-CM

## 2020-05-27 PROCEDURE — 99214 OFFICE O/P EST MOD 30 MIN: CPT | Performed by: NURSE PRACTITIONER

## 2020-05-27 RX ORDER — FUROSEMIDE 20 MG/1
20 TABLET ORAL DAILY
Qty: 90 TABLET | Refills: 2 | Status: SHIPPED | OUTPATIENT
Start: 2020-05-27 | End: 2020-08-06

## 2020-05-27 RX ORDER — OMEGA-3 FATTY ACIDS/FISH OIL 300-1000MG
1000 CAPSULE ORAL DAILY
Qty: 90 EACH | Refills: 3 | Status: SHIPPED | OUTPATIENT
Start: 2020-05-27 | End: 2021-12-21

## 2020-05-27 RX ORDER — LISINOPRIL 5 MG/1
5 TABLET ORAL DAILY
Qty: 90 TABLET | Refills: 3 | Status: SHIPPED | OUTPATIENT
Start: 2020-05-27 | End: 2020-11-30 | Stop reason: SDUPTHER

## 2020-05-27 RX ORDER — METOPROLOL SUCCINATE 25 MG/1
25 TABLET, EXTENDED RELEASE ORAL DAILY
Qty: 90 TABLET | Refills: 2 | Status: SHIPPED | OUTPATIENT
Start: 2020-05-27 | End: 2020-11-30 | Stop reason: SDUPTHER

## 2020-05-27 RX ORDER — ATORVASTATIN CALCIUM 40 MG/1
40 TABLET, FILM COATED ORAL DAILY
Qty: 30 TABLET | Refills: 11 | Status: SHIPPED | OUTPATIENT
Start: 2020-05-27 | End: 2020-05-27

## 2020-05-27 RX ORDER — PRAVASTATIN SODIUM 40 MG
40 TABLET ORAL DAILY
Qty: 90 TABLET | Refills: 3 | Status: SHIPPED | OUTPATIENT
Start: 2020-05-27 | End: 2020-11-30 | Stop reason: SDUPTHER

## 2020-05-27 RX ORDER — ASPIRIN 81 MG/1
81 TABLET ORAL DAILY
Qty: 90 TABLET | Refills: 3 | Status: SHIPPED | OUTPATIENT
Start: 2020-05-27 | End: 2020-11-30 | Stop reason: SDUPTHER

## 2020-05-27 NOTE — TELEPHONE ENCOUNTER
Colette Edmondson 1 hour ago (8:02 AM)     Patient scheduled for June 9th @ 9am. NPO 4 hour prior.      Informed pt of her results and the message from Sonali, she verbalized understanding. Pt stated she has an appt today w/ Sonali, I stated she would go over results in more depth at appt.

## 2020-05-27 NOTE — PROGRESS NOTES
Subjective   Marci Stuart is a 74 y.o. female     Chief Complaint   Patient presents with   • Hypertension       HPI    Problem List:    1. Dilated cardiomyopathy, EF initially felt to be 30 to 35%.  1.1 Most recent echocardiogram indicated , EF of 40-45%.  1.2 Low risk stress test in work-up for dilated cardiomyopathy.  1.3 Echo 6/21/17-mild LVH, EF 40-45%, diastolic dysfunction 2, moderate AR, mild MR, physiological TR, mild AK.  1.4 echo 5/19/2020-EF 56 to 60%, mild LVH, diastolic dysfunction 1, trivial to mild MR, trivial TR  2. Valvular heart disease   a. Moderate MR, Mild-Mod TR, moderate AI and mild-mod PI  2. Hypertension  3. Dyslipidemia  4. Tobacco Habituation   5. Carotid Duplex 4/2/15 - 16-49% narrowing to HAZEL and LICA, antegrade flow   5.1 carotid ultrasound 6/21/17-16-49% bilateral internal carotid arteries.  To moderate carotid bifurcation disease bilaterally with no flow limiting stenosis on either side.  Antegrade flow both vertebral arteries.      5.2 carotid artery ultrasound 5/19/2020-moderate bifurcation disease bilaterally 16 to 49%, relatively discrete 50 to 75% stenosis in the proximal right turn carotid artery with 50% or less or stenosis in the proximal and mid portions of left internal carotid artery, antegrade flow both vertebral arteries    Patient is a 74-year-old female who presents today for follow-up on testing.  She denies any chest pain, pressure, palpitations, fluttering, dizziness, presyncope, syncope, orthopnea or PND.  She says she only has swelling in her legs if she has been on them for long period of time and in the heat.  She denies any shortness of breath with activity.  She has been doing her yard work and is getting ready to  house.  She says she stays very active.  PCP does monitor her cholesterol.  Patient is smoking a pack a day.    We went over echo and carotid artery ultrasound.  I advised the CTA that was scheduled she originally did not want a  go to the hospital but they do not do at the imaging center and she did not want to go to [place] [facility] [place].  She decided she would just continue with appointment at hospital.    Current Outpatient Medications on File Prior to Visit   Medication Sig Dispense Refill   • Calcium Carbonate (CALTRATE 600) 1500 (600 CA) MG tablet Take 1 tablet by mouth Daily.     • CloNIDine (CATAPRES) 0.1 MG tablet Every 8 hours for SBP > 160 or DBP > 90 180 tablet 2   • diphenhydrAMINE HCl (BENADRYL ALLERGY PO) Take  by mouth At Night As Needed.     • MULTIPLE VITAMIN PO Take  by mouth daily.     • [DISCONTINUED] aspirin 81 MG EC tablet Take 1 tablet by mouth Daily. 90 tablet 3   • [DISCONTINUED] furosemide (LASIX) 20 MG tablet Take 1 tablet by mouth Daily. 90 tablet 2   • [DISCONTINUED] lisinopril (PRINIVIL,ZESTRIL) 5 MG tablet Take 1 tablet by mouth Daily. 90 tablet 3   • [DISCONTINUED] metoprolol succinate XL (TOPROL-XL) 25 MG 24 hr tablet Take 1 tablet by mouth Daily. 90 tablet 2   • [DISCONTINUED] Omega 3 1000 MG capsule Take 1,000 mg by mouth Daily. 30 each 11   • [DISCONTINUED] atorvastatin (LIPITOR) 40 MG tablet Take 1 tablet by mouth Daily. 30 tablet 11     No current facility-administered medications on file prior to visit.        ALLERGIES    Patient has no known allergies.    Past Medical History:   Diagnosis Date   • Coronary artery disease    • Hyperlipidemia    • Hypertension        Social History     Socioeconomic History   • Marital status:      Spouse name: Not on file   • Number of children: Not on file   • Years of education: Not on file   • Highest education level: Not on file   Tobacco Use   • Smoking status: Current Every Day Smoker     Packs/day: 1.00     Years: 39.00     Pack years: 39.00     Types: Cigarettes   • Smokeless tobacco: Never Used   Substance and Sexual Activity   • Alcohol use: No     Comment: A LITTLE WINE   • Drug use: Never   • Sexual activity: Defer       Family History   Problem  "Relation Age of Onset   • Heart attack Father    • Diabetes Other    • Hyperlipidemia Other    • Hypertension Other    • No Known Problems Mother        Review of Systems   Constitutional: Negative.  Negative for fatigue and fever.   HENT: Negative for congestion, rhinorrhea and sneezing.    Eyes: Positive for visual disturbance (WEARS GLASSES DAILY).   Respiratory: Negative for cough, chest tightness, shortness of breath and wheezing.    Cardiovascular: Positive for leg swelling (BLE SWELLING/EDEMA IF ON FEET FOR LONG PERIODS OF TIME; IN THE HEAT, PICKING WEEDS, ETC ). Negative for chest pain and palpitations.   Gastrointestinal: Negative for abdominal pain, blood in stool, constipation, diarrhea, nausea and vomiting.   Endocrine: Positive for heat intolerance (HEAT CAUSES FATIGUE ). Negative for cold intolerance.   Genitourinary: Negative for difficulty urinating, frequency, hematuria and urgency.   Musculoskeletal: Positive for arthralgias (JOINTS) and back pain (BACK PAIN FROM ARTHRITIS). Negative for neck pain and neck stiffness.   Skin: Negative.  Negative for rash and wound.   Allergic/Immunologic: Negative for environmental allergies and food allergies.   Neurological: Negative for dizziness, syncope, weakness, light-headedness and numbness.   Hematological: Bruises/bleeds easily (BLEEDS EASILY).   Psychiatric/Behavioral: Negative for agitation, confusion and sleep disturbance (DENIES WAKING UP SMOTHERING/SOA). The patient is not nervous/anxious.        Objective   /68 (BP Location: Left arm, Patient Position: Sitting)   Pulse 91   Temp 97.3 °F (36.3 °C)   Ht 157.5 cm (62\")   Wt 64 kg (141 lb)   SpO2 97%   BMI 25.79 kg/m²   Vitals:    05/27/20 1251   BP: 117/68   BP Location: Left arm   Patient Position: Sitting   Pulse: 91   Temp: 97.3 °F (36.3 °C)   SpO2: 97%   Weight: 64 kg (141 lb)   Height: 157.5 cm (62\")      Lab Results (most recent)     None        Physical Exam   Constitutional: She is " oriented to person, place, and time. Vital signs are normal. She appears well-developed and well-nourished. She is active and cooperative.   HENT:   Head: Normocephalic.   Eyes: Lids are normal.   WEARS GLASSES    Neck: Normal carotid pulses, no hepatojugular reflux and no JVD present. Carotid bruit is not present.   Cardiovascular: Normal rate and regular rhythm.   Murmur heard.   Systolic (RUSB AND LUSB 1-2/6) murmur is present.  Pulses:       Radial pulses are 2+ on the right side, and 2+ on the left side.        Dorsalis pedis pulses are 2+ on the right side, and 2+ on the left side.        Posterior tibial pulses are 2+ on the right side, and 2+ on the left side.   NO EDEMA BLE.    Pulmonary/Chest: Effort normal and breath sounds normal.   Abdominal: Normal appearance and bowel sounds are normal.   Neurological: She is alert and oriented to person, place, and time.   Skin: Skin is warm, dry and intact.   Psychiatric: She has a normal mood and affect. Her speech is normal and behavior is normal. Judgment and thought content normal. Cognition and memory are normal.       Procedure   Procedures         Assessment/Plan      Diagnosis Plan   1. Cardiomyopathy, dilated (CMS/HCC)  aspirin 81 MG EC tablet   2. Essential hypertension  lisinopril (PRINIVIL,ZESTRIL) 5 MG tablet    metoprolol succinate XL (TOPROL-XL) 25 MG 24 hr tablet    aspirin 81 MG EC tablet   3. Hyperlipidemia, unspecified hyperlipidemia type  pravastatin (PRAVACHOL) 40 MG tablet    Comprehensive Metabolic Panel   4. Bilateral carotid artery stenosis  pravastatin (PRAVACHOL) 40 MG tablet    Lipid Panel    Comprehensive Metabolic Panel   5. Grade II diastolic dysfunction     6. Peripheral edema     7. Smoking     8. Cardiomyopathy, unspecified type (CMS/HCC)  lisinopril (PRINIVIL,ZESTRIL) 5 MG tablet    furosemide (LASIX) 20 MG tablet   9. Dyslipidemia  Omega 3 1000 MG capsule    Lipid Panel   10. Healthcare maintenance  Lipid Panel    Comprehensive  Metabolic Panel       Return in about 6 months (around 11/27/2020).  Cardiomyopathy-patient is on lisinopril, metoprolol and Lasix.  Hypertension-patient is doing very well on lisinopril metoprolol.  Bilateral carotid artery disease-patient will go back on her pravastatin and she is already on aspirin.  She is getting a CTA of the carotids which has been scheduled through the hospital.  She will get a CMP and lipid in 6 weeks.  Diastolic dysfunction 2/peripheral edema-patient has Lasix to use.  Smoking-encouraged cessation.  Dyslipidemia-patient's on omega-3 and restarting pravastatin.  She will continue her medication regimen otherwise.  She will follow-up in 6 months or sooner if any changes.         Marci CARLOS Stuart  reports that she has been smoking cigarettes. She has a 39.00 pack-year smoking history. She has never used smokeless tobacco.. I have educated her on the risk of diseases from using tobacco products such as cancer, COPD and heart diease.     Advance Care Planning   ACP discussion was held with the patient during this visit. Patient does not have an advance directive, information provided.    Patient's Body mass index is 25.79 kg/m². BMI is above normal parameters. Recommendations include: educational material and referral to primary care.      Electronically signed by:

## 2020-05-27 NOTE — PATIENT INSTRUCTIONS
Advance Care Planning and Advance Directives     You make decisions on a daily basis - decisions about where you want to live, your career, your home, your life. Perhaps one of the most important decisions you face is your choice for future medical care. Take time to talk with your family and your healthcare team and start planning today.  Advance Care Planning is a process that can help you:  · Understand possible future healthcare decisions in light of your own experiences  · Reflect on those decision in light of your goals and values  · Discuss your decisions with those closest to you and the healthcare professionals that care for you  · Make a plan by creating a document that reflects your wishes    Surrogate Decision Maker  In the event of a medical emergency, which has left you unable to communicate or to make your own decisions, you would need someone to make decisions for you.  It is important to discuss your preferences for medical treatment with this person while you are in good health.     Qualities of a surrogate decision maker:  • Willing to take on this role and responsibility  • Knows what you want for future medical care  • Willing to follow your wishes even if they don't agree with them  • Able to make difficult medical decisions under stressful circumstances    Advance Directives  These are legal documents you can create that will guide your healthcare team and decision maker(s) when needed. These documents can be stored in the electronic medical record.    · Living Will - a legal document to guide your care if you have a terminal condition or a serious illness and are unable to communicate. States vary by statute in document names/types, but most forms may include one or more of the following:        -  Directions regarding life-prolonging treatments        -  Directions regarding artificially provided nutrition/hydration        -  Choosing a healthcare decision maker        -  Direction  regarding organ/tissue donation    · Durable Power of  for Healthcare - this document names an -in-fact to make medical decisions for you, but it may also allow this person to make personal and financial decisions for you. Please seek the advice of an  if you need this type of document.    **Advance Directives are not required and no one may discriminate against you if you do not sign one.    Medical Orders  Many states allow specific forms/orders signed by your physician to record your wishes for medical treatment in your current state of health. This form, signed in personal communication with your physician, addresses resuscitation and other medical interventions that you may or may not want.      For more information or to schedule a time with a [facility] Advance Care Planning Facilitator contact: [facility] or call [phone] and someone will contact you directly.  BMI for Adults    Body mass index (BMI) is a number that is calculated from a person's weight and height. BMI may help to estimate how much of a person's weight is composed of fat. BMI can help identify those who may be at higher risk for certain medical problems.  How is BMI used with adults?  BMI is used as a screening tool to identify possible weight problems. It is used to check whether a person is obese, overweight, healthy weight, or underweight.  How is BMI calculated?  BMI measures your weight and compares it to your height. This can be done either in English ([place] or metric measurements. Note that charts are available to help you find your BMI quickly and easily without having to do these calculations yourself.  To calculate your BMI in English ([place] measurements, your health care provider will:  1. Measure your weight in pounds (lb).  2. Multiply the number of pounds by 703.  ? For example, for a person who weighs 180 lb, multiply that number by 703, which equals 126,540.  3. Measure your height  "in inches (in). Then multiply that number by itself to get a measurement called \"inches squared.\"  ? For example, for a person who is 70 in tall, the \"inches squared\" measurement is 70 in x 70 in, which equals 4900 inches squared.  4. Divide the total from Step 2 (number of lb x 703) by the total from Step 3 (inches squared): 126,540 ÷ 4900 = 25.8. This is your BMI.  To calculate your BMI in metric measurements, your health care provider will:  1. Measure your weight in kilograms (kg).  2. Measure your height in meters (m). Then multiply that number by itself to get a measurement called \"meters squared.\"  ? For example, for a person who is 1.75 m tall, the \"meters squared\" measurement is 1.75 m x 1.75 m, which is equal to 3.1 meters squared.  3. Divide the number of kilograms (your weight) by the meters squared number. In this example: 70 ÷ 3.1 = 22.6. This is your BMI.  How is BMI interpreted?  To interpret your results, your health care provider will use BMI charts to identify whether you are underweight, normal weight, overweight, or obese. The following guidelines will be used:  · Underweight: BMI less than 18.5.  · Normal weight: BMI between 18.5 and 24.9.  · Overweight: BMI between 25 and 29.9.  · Obese: BMI of 30 and above.  Please note:  · Weight includes both fat and muscle, so someone with a muscular build, such as an athlete, may have a BMI that is higher than 24.9. In cases like these, BMI is not an accurate measure of body fat.  · To determine if excess body fat is the cause of a BMI of 25 or higher, further assessments may need to be done by a health care provider.  · BMI is usually interpreted in the same way for men and women.  Why is BMI a useful tool?  BMI is useful in two ways:  · Identifying a weight problem that may be related to a medical condition, or that may increase the risk for medical problems.  · Promoting lifestyle and diet changes in order to reach a healthy weight.  Summary  · Body " mass index (BMI) is a number that is calculated from a person's weight and height.  · BMI may help to estimate how much of a person's weight is composed of fat. BMI can help identify those who may be at higher risk for certain medical problems.  · BMI can be measured using English measurements or metric measurements.  · To interpret your results, your health care provider will use BMI charts to identify whether you are underweight, normal weight, overweight, or obese.  This information is not intended to replace advice given to you by your health care provider. Make sure you discuss any questions you have with your health care provider.  Document Released: 08/29/2005 Document Revised: 11/30/2018 Document Reviewed: 10/31/2018  [employer] Patient Education © 2020 [employer]    For more information:    Quit Now [facility]  1-800-QUIT-NOW  https://[email or web address].quitlogix.org/en-[place]/  Steps to Quit Smoking  Smoking tobacco can be harmful to your health and can affect almost every organ in your body. Smoking puts you, and those around you, at risk for developing many serious chronic diseases. Quitting smoking is difficult, but it is one of the best things that you can do for your health. It is never too late to quit.  What are the benefits of quitting smoking?  When you quit smoking, you lower your risk of developing serious diseases and conditions, such as:  · Lung cancer or lung disease, such as COPD.  · Heart disease.  · Stroke.  · Heart attack.  · Infertility.  · Osteoporosis and bone fractures.  Additionally, symptoms such as coughing, wheezing, and shortness of breath may get better when you quit. You may also find that you get sick less often because your body is stronger at fighting off colds and infections. If you are pregnant, quitting smoking can help to reduce your chances of having a baby of low birth weight.  How do I get ready to quit?  When you decide to quit smoking, create a plan to make sure that you are  successful. Before you quit:  · Pick a date to quit. Set a date within the next two weeks to give you time to prepare.  · Write down the reasons why you are quitting. Keep this list in places where you will see it often, such as on your bathroom mirror or in your car or wallet.  · Identify the people, places, things, and activities that make you want to smoke (triggers) and avoid them. Make sure to take these actions:  ¨ Throw away all cigarettes at home, at work, and in your car.  ¨ Throw away smoking accessories, such as ashtrays and lighters.  ¨ Clean your car and make sure to empty the ashtray.  ¨ Clean your home, including curtains and carpets.  · Tell your family, friends, and coworkers that you are quitting. Support from your loved ones can make quitting easier.  · Talk with your health care provider about your options for quitting smoking.  · Find out what treatment options are covered by your health insurance.  What strategies can I use to quit smoking?  Talk with your healthcare provider about different strategies to quit smoking. Some strategies include:  · Quitting smoking altogether instead of gradually lessening how much you smoke over a period of time. Research shows that quitting “cold turkey” is more successful than gradually quitting.  · Attending in-person counseling to help you build problem-solving skills. You are more likely to have success in quitting if you attend several counseling sessions. Even short sessions of 10 minutes can be effective.  · Finding resources and support systems that can help you to quit smoking and remain smoke-free after you quit. These resources are most helpful when you use them often. They can include:  ¨ Online chats with a counselor.  ¨ Telephone quitlines.  ¨ Printed self-help materials.  ¨ Support groups or group counseling.  ¨ Text messaging programs.  ¨ Mobile phone applications.  · Taking medicines to help you quit smoking. (If you are pregnant or  breastfeeding, talk with your health care provider first.) Some medicines contain nicotine and some do not. Both types of medicines help with cravings, but the medicines that include nicotine help to relieve withdrawal symptoms. Your health care provider may recommend:  ¨ Nicotine patches, gum, or lozenges.  ¨ Nicotine inhalers or sprays.  ¨ Non-nicotine medicine that is taken by mouth.  Talk with your health care provider about combining strategies, such as taking medicines while you are also receiving in-person counseling. Using these two strategies together makes you more likely to succeed in quitting than if you used either strategy on its own.  If you are pregnant or breastfeeding, talk with your health care provider about finding counseling or other support strategies to quit smoking. Do not take medicine to help you quit smoking unless told to do so by your health care provider.  What things can I do to make it easier to quit?  Quitting smoking might feel overwhelming at first, but there is a lot that you can do to make it easier. Take these important actions:  · Reach out to your family and friends and ask that they support and encourage you during this time. Call telephone quitlines, reach out to support groups, or work with a counselor for support.  · Ask people who smoke to avoid smoking around you.  · Avoid places that trigger you to smoke, such as bars, parties, or smoke-break areas at work.  · Spend time around people who do not smoke.  · Lessen stress in your life, because stress can be a smoking trigger for some people. To lessen stress, try:  ¨ Exercising regularly.  ¨ Deep-breathing exercises.  ¨ Yoga.  ¨ Meditating.  ¨ Performing a body scan. This involves closing your eyes, scanning your body from head to toe, and noticing which parts of your body are particularly tense. Purposefully relax the muscles in those areas.  · Download or purchase mobile phone or tablet apps (applications) that can help  you stick to your quit plan by providing reminders, tips, and encouragement. There are many free apps, such as QuitGuide from the CDC (Centers for Disease Control and Prevention). You can find other support for quitting smoking (smoking cessation) through smokefree.gov and other websites.  How will I feel when I quit smoking?  Within the first 24 hours of quitting smoking, you may start to feel some withdrawal symptoms. These symptoms are usually most noticeable 2-3 days after quitting, but they usually do not last beyond 2-3 weeks. Changes or symptoms that you might experience include:  · Mood swings.  · Restlessness, anxiety, or irritation.  · Difficulty concentrating.  · Dizziness.  · Strong cravings for sugary foods in addition to nicotine.  · Mild weight gain.  · Constipation.  · Nausea.  · Coughing or a sore throat.  · Changes in how your medicines work in your body.  · A depressed mood.  · Difficulty sleeping (insomnia).  After the first 2-3 weeks of quitting, you may start to notice more positive results, such as:  · Improved sense of smell and taste.  · Decreased coughing and sore throat.  · Slower heart rate.  · Lower blood pressure.  · Clearer skin.  · The ability to breathe more easily.  · Fewer sick days.  Quitting smoking is very challenging for most people. Do not get discouraged if you are not successful the first time. Some people need to make many attempts to quit before they achieve long-term success. Do your best to stick to your quit plan, and talk with your health care provider if you have any questions or concerns.  This information is not intended to replace advice given to you by your health care provider. Make sure you discuss any questions you have with your health care provider.  Document Released: 12/12/2002 Document Revised: 08/15/2017 Document Reviewed: 05/03/2016  [facility] Interactive Patient Education © 2017 [facility] [employer]    Advance Directive    Advance directives are legal documents  that let you make choices ahead of time about your health care and medical treatment in case you become unable to communicate for yourself. Advance directives are a way for you to communicate your wishes to family, friends, and health care providers. This can help convey your decisions about end-of-life care if you become unable to communicate.  Discussing and writing advance directives should happen over time rather than all at once. Advance directives can be changed depending on your situation and what you want, even after you have signed the advance directives.  If you do not have an advance directive, some states assign family decision makers to act on your behalf based on how closely you are related to them. Each state has its own laws regarding advance directives. You may want to check with your health care provider, , or state representative about the laws in your state. There are different types of advance directives, such as:  · Medical power of .  · Living will.  · Do not resuscitate (DNR) or do not attempt resuscitation (DNAR) order.  Health care proxy and medical power of   A health care proxy, also called a health care agent, is a person who is appointed to make medical decisions for you in cases in which you are unable to make the decisions yourself. Generally, people choose someone they know well and trust to represent their preferences. Make sure to ask this person for an agreement to act as your proxy. A proxy may have to exercise judgment in the event of a medical decision for which your wishes are not known.  A medical power of  is a legal document that names your health care proxy. Depending on the laws in your state, after the document is written, it may also need to be:  · Signed.  · Notarized.  · Dated.  · Copied.  · Witnessed.  · Incorporated into your medical record.  You may also want to appoint someone to manage your financial affairs in a situation in which  you are unable to do so. This is called a durable power of  for finances. It is a separate legal document from the durable power of  for health care. You may choose the same person or someone different from your health care proxy to act as your agent in financial matters.  If you do not appoint a proxy, or if there is a concern that the proxy is not acting in your best interests, a court-appointed guardian may be designated to act on your behalf.  Living will  A living will is a set of instructions documenting your wishes about medical care when you cannot express them yourself. Health care providers should keep a copy of your living will in your medical record. You may want to give a copy to family members or friends. To alert caregivers in case of an emergency, you can place a card in your wallet to let them know that you have a living will and where they can find it. A living will is used if you become:  · Terminally ill.  · Incapacitated.  · Unable to communicate or make decisions.  Items to consider in your living will include:  · The use or non-use of life-sustaining equipment, such as dialysis machines and breathing machines (ventilators).  · A DNR or DNAR order, which is the instruction not to use cardiopulmonary resuscitation (CPR) if breathing or heartbeat stops.  · The use or non-use of tube feeding.  · Withholding of food and fluids.  · Comfort (palliative) care when the goal becomes comfort rather than a cure.  · Organ and tissue donation.  A living will does not give instructions for distributing your money and property if you should pass away. It is recommended that you seek the advice of a  when writing a will. Decisions about taxes, beneficiaries, and asset distribution will be legally binding. This process can relieve your family and friends of any concerns surrounding disputes or questions that may come up about the distribution of your assets.  DNR or DNAR  A DNR or DNAR  "order is a request not to have CPR in the event that your heart stops beating or you stop breathing. If a DNR or DNAR order has not been made and shared, a health care provider will try to help any patient whose heart has stopped or who has stopped breathing. If you plan to have surgery, talk with your health care provider about how your DNR or DNAR order will be followed if problems occur.  Summary  · Advance directives are the legal documents that allow you to make choices ahead of time about your health care and medical treatment in case you become unable to communicate for yourself.  · The process of discussing and writing advance directives should happen over time. You can change the advance directives, even after you have signed them.  · Advance directives include DNR or DNAR orders, living haskins, and designating an agent as your medical power of .  This information is not intended to replace advice given to you by your health care provider. Make sure you discuss any questions you have with your health care provider.  Document Released: 03/26/2009 Document Revised: 01/22/2020 Document Reviewed: 11/06/2017  [employer] Patient Education © 2020 [employer]  Carotid Artery Disease  The carotid arteries are the two main arteries on either side of the neck. They supply blood to the brain, face, and neck. Carotid artery disease, also called carotid artery stenosis, is the narrowing or blockage of one or both carotid arteries. Carotid artery disease increases your risk for a stroke or a transient ischemic attack (TIA). A TIA is an episode in which blood flow to the brain is temporarily blocked. A TIA is considered a \"warning stroke.\"  What are the causes?  This condition is primarily caused by buildup of plaque inside the carotid arteries (atherosclerosis).  What increases the risk?  The following factors may make you more likely to develop this condition:  · High cholesterol (dyslipidemia).  · High blood " pressure (hypertension).  · Smoking.  · Obesity.  · Diabetes.  · Family history of cardiovascular disease.  · Inactivity or lack of regular exercise.  · Being male. Men have an increased risk of developing atherosclerosis earlier in life than women.  · Old age.  What are the signs or symptoms?  This condition may not have any signs or symptoms until a stroke or TIA occurs. In some cases, your doctor may be able to hear a whooshing sound (bruit) which can indicate a change in blood flow due to plaque buildup. An eye exam can also help identify signs of the condition.  How is this diagnosed?  This condition may be diagnosed by:  · A physical exam.  · Your family and medical history.  · Specific tests that look at the blood flow in the carotid arteries. These tests include:  ? Carotid artery ultrasound. This test uses sound waves to create pictures of your arteries and show whether they are narrow or blocked.  ? Carotid or cerebral angiography. During this test, a special dye will be injected into a vein. The dye will show up on an X-ray to help highlight your arteries.  ? Computerized tomographic angiography (CTA). During this test, a special dye will be injected into a vein. The dye will show up on the CT scan to help highlight your arteries.  ? Magnetic resonance angiography (MRA). During this test, a special dye will be injected into a vein. The dye will show up on the MRI to help highlight your arteries.  How is this treated?  Treatment of this condition may include a combination of treatments. Treatment options include:  · Lifestyle changes such as:  ? Quitting smoking.  ? Exercising regularly or as directed by your health care provider.  ? Eating a heart-healthy diet.  ? Managing stress.  ? Maintaining a healthy weight.  · Medicines to control:  ? Blood pressure.  ? Cholesterol.  ? Blood clotting.  · Surgery. You may have:  ? A carotid endarterectomy. This is a surgery to remove the blockages in the carotid  arteries.  ? A carotid angioplasty with stenting. This is a procedure in which a wire mesh (stent) is used to widen the blocked carotid arteries.  Follow these instructions at home:  Lifestyle  · Follow your health care provider's instructions about your diet. It is important to eat a healthy diet that is low in saturated fats and includes plenty of fresh fruits, vegetables, and lean meats. You should avoid high-fat, high-sodium foods as well as foods that are fried, overly processed, or have poor nutritional value.  · Maintain a healthy weight.  · Stay physically active. It is recommended that you get at least 150 minutes of moderate exercise or 75 minutes of strenuous exercise each week.  · Do not use any products that contain nicotine or tobacco, such as cigarettes and e-cigarettes. If you need help quitting, ask your health care provider.  · Limit alcohol intake to no more than 1 drink a day for non-pregnant women and 2 drinks a day for men. One drink equals 12 oz. of beer, 5 oz. of wine, or 1½ oz. of hard liquor.  · Do not use illegal drugs.  · Manage your stress. Ask your health care provider for stress management tips.  General instructions  · Take over-the-counter and prescription medicines only as told by your health care provider.  · Keep all follow-up visits as told by your health care provider. This is important.  Get help right away if:  · You have any symptoms of stroke or TIA. The acronym BEFAST is an easy way to remember the main warning signs of stroke.  ? B = Balance problems. Signs include dizziness, sudden trouble walking, or loss of balance  ? E = Eye problems. This includes trouble seeing or a sudden change in vision.  ? F = Face changes. This includes sudden weakness or numbness of the face, or the face or eyelid drooping to one side.  ? A = Arm weakness or numbness. This happens suddenly and usually on one side of the body.  ? S = Speech problems. This includes trouble speaking or trouble  understanding speech.  ? T = Time. Time to call 911 or seek emergency care. Do not wait to see if symptoms go away. Make note of the time your symptoms started.  · Other signs of stroke may include:  ? A sudden, severe headache with no known cause.  ? Nausea or vomiting.  ? Seizure.  These symptoms may represent a serious problem that is an emergency. Do not wait to see if the symptoms will go away. Get medical help right away. Call your local emergency services (911 in the [place] Do not drive yourself to the hospital.  Summary  · Carotid artery disease, also called carotid artery stenosis, is the narrowing or blockage of one or both carotid arteries.  · Carotid artery disease increases your risk for a stroke or a transient ischemic attack (TIA).  · This condition can be treated with lifestyle changes, medicines, surgery, or a combination of these treatments.  · Do not use any products that contain nicotine or tobacco, such as cigarettes and e-cigarettes. If you need help quitting, ask your health care provider.  This information is not intended to replace advice given to you by your health care provider. Make sure you discuss any questions you have with your health care provider.  Document Released: 03/11/2013 Document Revised: 11/30/2018 Document Reviewed: 01/22/2018  Elsevier Patient Education © 2020 [employer]

## 2020-05-28 ENCOUNTER — TELEPHONE (OUTPATIENT)
Dept: CARDIOLOGY | Facility: CLINIC | Age: 75
End: 2020-05-28

## 2020-05-28 NOTE — TELEPHONE ENCOUNTER
Called and reviewed echo results with patient. Notified to keep follow up as scheduled. Patient verbalized understanding and had no further questions at this time. -;[facility]    ----- Message from KOFI Thomas sent at 5/26/2020  2:31 PM EDT -----  Please advise patient.  Adult Transthoracic Echo Complete W/ Cont if Necessary Per Protocol   Order: [id number]   Status:  Final result   Visible to patient:  No (Not Released) Dx:  Essential hypertension; Dyslipidemia;...   Details     Reading Physician Reading Date Result Priority   Clifford Valero MD 5/19/2020 Routine      Result Text     1.  LV size, function, and wall motion are normal.  Mild concentric left ventricular hypertrophy with grade 1 diastolic dysfunction and mild left atrial enlargement.  Right heart chambers are normal.  No septal defect mass or thrombus.     2.  There is mild thickening of the base of the posterior mitral leaflet with no mitral stenosis and with trivial to mild MR.  The aortic valve is not well visualized but appears to have adequate excursion and to be somewhat sclerotic.  There is no aortic stenosis there is mild AI.  Trivial tricuspid regurgitation from a normal valve.     3.  No pericardial or great vessel pathology.     4.  Normal pulmonary pressures.

## 2020-06-09 ENCOUNTER — TELEPHONE (OUTPATIENT)
Dept: CARDIOLOGY | Facility: CLINIC | Age: 75
End: 2020-06-09

## 2020-06-09 DIAGNOSIS — I65.21 STENOSIS OF RIGHT CAROTID ARTERY: Primary | ICD-10-CM

## 2020-06-09 NOTE — TELEPHONE ENCOUNTER
Per Sonali, pt's CTA neck showed 90% stenosis in right carotid artery. Pt needs to be referred to neurosurgery. She can see Dr. Han at [facility] or see Dr. Blankenship or Dr. Amaya.

## 2020-06-09 NOTE — TELEPHONE ENCOUNTER
Informed pt of her results and the message from Sonali. She stated that she would see Dr. Amaya because she thinks she's heard of him before.

## 2020-06-24 ENCOUNTER — TELEPHONE (OUTPATIENT)
Dept: CARDIOLOGY | Facility: CLINIC | Age: 75
End: 2020-06-24

## 2020-07-07 ENCOUNTER — TELEPHONE (OUTPATIENT)
Dept: CARDIOLOGY | Facility: CLINIC | Age: 75
End: 2020-07-07

## 2020-07-07 NOTE — TELEPHONE ENCOUNTER
----- Message from Vivian Franco LPN sent at 7/7/2020  2:43 PM EDT -----  Regarding: Triage message left  Pts dtr Debi Andreea left message on triage, she is not on hipaa. Said Sonali ordered U/S and CT, sent to [facility], was told she needed surgery and has not heart anything since. She is insisting someone needs to follow up , that she is from [place] and wants this taken care of asap........ [phone]

## 2020-07-07 NOTE — TELEPHONE ENCOUNTER
"Called and spoke with patient, notified he we had received call however we could not call her daughter back because she was not on Hip[employer]. Patient stated \"oh that was probably just my daughter.\" I asked her if they had called [facility] office yet, that we had faxed over cardiac clearance req already. She stated she had not, patient was given the number for their office today contact them and see what was going on with her surgery. Patient verbalized understanding and stated she would call them. Notified her next time she was in office if she wants us to be able to call daughter she will need to add her to Hip[employer] form. Patient verbalized understanding at this time. -;ELIZABETH  "

## 2020-08-06 DIAGNOSIS — I42.9 CARDIOMYOPATHY, UNSPECIFIED TYPE (HCC): ICD-10-CM

## 2020-08-06 RX ORDER — FUROSEMIDE 20 MG/1
20 TABLET ORAL DAILY
Qty: 90 TABLET | Refills: 1 | Status: SHIPPED | OUTPATIENT
Start: 2020-08-06 | End: 2020-11-30 | Stop reason: SDUPTHER

## 2020-11-30 ENCOUNTER — OFFICE VISIT (OUTPATIENT)
Dept: CARDIOLOGY | Facility: CLINIC | Age: 75
End: 2020-11-30

## 2020-11-30 VITALS
DIASTOLIC BLOOD PRESSURE: 83 MMHG | HEIGHT: 62 IN | TEMPERATURE: 97.3 F | BODY MASS INDEX: 26.68 KG/M2 | SYSTOLIC BLOOD PRESSURE: 162 MMHG | HEART RATE: 90 BPM | OXYGEN SATURATION: 97 % | WEIGHT: 145 LBS

## 2020-11-30 DIAGNOSIS — I42.0 CARDIOMYOPATHY, DILATED (HCC): ICD-10-CM

## 2020-11-30 DIAGNOSIS — I65.23 BILATERAL CAROTID ARTERY STENOSIS: ICD-10-CM

## 2020-11-30 DIAGNOSIS — I10 ESSENTIAL HYPERTENSION: ICD-10-CM

## 2020-11-30 DIAGNOSIS — F17.200 SMOKING: ICD-10-CM

## 2020-11-30 DIAGNOSIS — I42.9 CARDIOMYOPATHY, UNSPECIFIED TYPE (HCC): ICD-10-CM

## 2020-11-30 DIAGNOSIS — R94.31 EKG ABNORMALITIES: Primary | ICD-10-CM

## 2020-11-30 DIAGNOSIS — E78.5 HYPERLIPIDEMIA, UNSPECIFIED HYPERLIPIDEMIA TYPE: ICD-10-CM

## 2020-11-30 PROCEDURE — 99214 OFFICE O/P EST MOD 30 MIN: CPT | Performed by: NURSE PRACTITIONER

## 2020-11-30 PROCEDURE — 93000 ELECTROCARDIOGRAM COMPLETE: CPT | Performed by: NURSE PRACTITIONER

## 2020-11-30 RX ORDER — FUROSEMIDE 20 MG/1
20 TABLET ORAL DAILY
Qty: 90 TABLET | Refills: 3 | Status: SHIPPED | OUTPATIENT
Start: 2020-11-30 | End: 2021-04-22 | Stop reason: SDUPTHER

## 2020-11-30 RX ORDER — LISINOPRIL 5 MG/1
5 TABLET ORAL DAILY
Qty: 90 TABLET | Refills: 3 | Status: SHIPPED | OUTPATIENT
Start: 2020-11-30 | End: 2021-04-22 | Stop reason: SDUPTHER

## 2020-11-30 RX ORDER — METOPROLOL SUCCINATE 25 MG/1
25 TABLET, EXTENDED RELEASE ORAL DAILY
Qty: 90 TABLET | Refills: 2 | Status: SHIPPED | OUTPATIENT
Start: 2020-11-30 | End: 2021-04-22 | Stop reason: SDUPTHER

## 2020-11-30 RX ORDER — PRAVASTATIN SODIUM 40 MG
40 TABLET ORAL DAILY
Qty: 90 TABLET | Refills: 3 | Status: SHIPPED | OUTPATIENT
Start: 2020-11-30 | End: 2021-04-22 | Stop reason: SDUPTHER

## 2020-11-30 RX ORDER — ASPIRIN 81 MG/1
81 TABLET ORAL DAILY
Qty: 90 TABLET | Refills: 3 | Status: SHIPPED | OUTPATIENT
Start: 2020-11-30 | End: 2021-04-22 | Stop reason: SDUPTHER

## 2020-11-30 RX ORDER — CETIRIZINE HYDROCHLORIDE 10 MG/1
10 TABLET ORAL DAILY
COMMUNITY
End: 2021-12-21

## 2020-11-30 NOTE — PROGRESS NOTES
Subjective   Marci Stuart is a 75 y.o. female     Chief Complaint   Patient presents with   • Follow-up     six month follow up       HPI    Problem List:    1. Dilated cardiomyopathy, EF initially felt to be 30 to 35%.  1.1 Most recent echocardiogram indicated , EF of 40-45%.  1.2 Low risk stress test in work-up for dilated cardiomyopathy.  1.3 Echo 6/21/17-mild LVH, EF 40-45%, diastolic dysfunction 2, moderate AR, mild MR, physiological TR, mild NM.  1.4 echo 5/19/2020-EF 56 to 60%, mild LVH, diastolic dysfunction 1, trivial to mild MR, trivial TR  2. Valvular heart disease   a. Moderate MR, Mild-Mod TR, moderate AI and mild-mod PI  2. Hypertension  3. Dyslipidemia  4. Tobacco Habituation   5. Carotid Duplex 4/2/15 - 16-49% narrowing to HAZEL and LICA, antegrade flow   5.1 carotid ultrasound 6/21/17-16-49% bilateral internal carotid arteries.  To moderate carotid bifurcation disease bilaterally with no flow limiting stenosis on either side.  Antegrade flow both vertebral arteries.      5.2 carotid artery ultrasound 5/19/2020-moderate bifurcation disease bilaterally 16 to 49%, relatively discrete 50 to 75% stenosis in the proximal right turn carotid artery with 50% or less or stenosis in the proximal and mid portions of left internal carotid artery, antegrade flow both vertebral arteries       5.3 right carotid endarterectomy 8/5/2020, Dr. Amaya    Patient is a 75-year-old female who presents today for follow-up.  Patient did have right carotid endarterectomy back in August.  She has a scheduled repeat CT of the neck in February.  She denies any chest pain, pressure, palpitations, fluttering, dizziness, presyncope, syncope, orthopnea, PND or edema.  She denies any shortness of breath with activity.  She says her blood pressures only elevated because she has been out shopping for the last hour.  She says otherwise she has been doing absolutely fine and stays very busy.  PCP monitors her cholesterol.  Patient  still smokes a pack a day.    Current Outpatient Medications on File Prior to Visit   Medication Sig Dispense Refill   • Calcium Carbonate (CALTRATE 600) 1500 (600 CA) MG tablet Take 1 tablet by mouth Daily.     • cetirizine (zyrTEC) 10 MG tablet Take 10 mg by mouth Daily.     • CloNIDine (CATAPRES) 0.1 MG tablet Every 8 hours for SBP > 160 or DBP > 90 180 tablet 2   • diphenhydrAMINE HCl (BENADRYL ALLERGY PO) Take  by mouth At Night As Needed.     • MULTIPLE VITAMIN PO Take  by mouth daily.     • Omega 3 1000 MG capsule Take 1,000 mg by mouth Daily. 90 each 3   • [DISCONTINUED] aspirin 81 MG EC tablet Take 1 tablet by mouth Daily. 90 tablet 3   • [DISCONTINUED] furosemide (LASIX) 20 MG tablet Take 1 tablet by mouth Daily. 90 tablet 1   • [DISCONTINUED] lisinopril (PRINIVIL,ZESTRIL) 5 MG tablet Take 1 tablet by mouth Daily. 90 tablet 3   • [DISCONTINUED] metoprolol succinate XL (TOPROL-XL) 25 MG 24 hr tablet Take 1 tablet by mouth Daily. 90 tablet 2   • [DISCONTINUED] pravastatin (PRAVACHOL) 40 MG tablet Take 1 tablet by mouth Daily. 90 tablet 3     No current facility-administered medications on file prior to visit.        ALLERGIES    Sulfa antibiotics    Past Medical History:   Diagnosis Date   • Coronary artery disease    • Hyperlipidemia    • Hypertension        Social History     Socioeconomic History   • Marital status:      Spouse name: Not on file   • Number of children: Not on file   • Years of education: Not on file   • Highest education level: Not on file   Tobacco Use   • Smoking status: Current Every Day Smoker     Packs/day: 1.00     Years: 39.00     Pack years: 39.00     Types: Cigarettes   • Smokeless tobacco: Never Used   Substance and Sexual Activity   • Alcohol use: No     Comment: A LITTLE WINE   • Drug use: Never   • Sexual activity: Defer       Family History   Problem Relation Age of Onset   • Heart attack Father    • Diabetes Other    • Hyperlipidemia Other    • Hypertension Other   "  • No Known Problems Mother        Review of Systems   Constitutional: Negative for chills, fatigue and fever.   HENT: Positive for rhinorrhea (allergies). Negative for congestion and sore throat.    Eyes: Positive for visual disturbance (glasses).   Respiratory: Positive for cough (allergies). Negative for chest tightness, shortness of breath and wheezing.    Cardiovascular: Negative for chest pain, palpitations and leg swelling.   Gastrointestinal: Negative for abdominal pain, blood in stool, nausea and vomiting.   Endocrine: Negative for cold intolerance and heat intolerance.   Genitourinary: Negative for dysuria, frequency, hematuria and urgency.   Musculoskeletal: Positive for back pain (lower back). Negative for arthralgias.   Skin: Negative.  Negative for rash and wound.   Allergic/Immunologic: Positive for environmental allergies (seasonal). Negative for food allergies.   Neurological: Negative for dizziness, weakness and light-headedness.   Hematological: Bruises/bleeds easily.   Psychiatric/Behavioral: Negative for sleep disturbance (denies waking with smothering at night).       Objective   /83 (BP Location: Left arm, Patient Position: Sitting)   Pulse 90   Temp 97.3 °F (36.3 °C)   Ht 157.5 cm (62\")   Wt 65.8 kg (145 lb)   SpO2 97%   BMI 26.52 kg/m²   Vitals:    11/30/20 1259   BP: 162/83   BP Location: Left arm   Patient Position: Sitting   Pulse: 90   Temp: 97.3 °F (36.3 °C)   SpO2: 97%   Weight: 65.8 kg (145 lb)   Height: 157.5 cm (62\")      Lab Results (most recent)     None        Physical Exam  Vitals signs reviewed.   Constitutional:       General: She is awake.      Appearance: Normal appearance. She is well-developed, well-groomed and overweight.   HENT:      Head: Normocephalic.   Eyes:      General: Lids are normal.      Comments: Wears glasses    Neck:      Vascular: No carotid bruit, hepatojugular reflux or JVD.   Cardiovascular:      Rate and Rhythm: Normal rate and regular " rhythm.      Pulses:           Radial pulses are 2+ on the right side and 2+ on the left side.        Dorsalis pedis pulses are 2+ on the right side and 2+ on the left side.        Posterior tibial pulses are 2+ on the right side and 2+ on the left side.      Heart sounds: Normal heart sounds.   Pulmonary:      Effort: Pulmonary effort is normal.      Breath sounds: Normal breath sounds and air entry.   Abdominal:      General: Bowel sounds are normal.      Palpations: Abdomen is soft.   Musculoskeletal:      Right lower leg: No edema.      Left lower leg: No edema.   Skin:     General: Skin is warm and dry.      Comments: Scar right neck    Neurological:      Mental Status: She is alert and oriented to person, place, and time.   Psychiatric:         Attention and Perception: Attention and perception normal.         Mood and Affect: Mood and affect normal.         Speech: Speech normal.         Behavior: Behavior normal. Behavior is cooperative.         Thought Content: Thought content normal.         Cognition and Memory: Cognition and memory normal.         Judgment: Judgment normal.         Procedure     ECG 12 Lead    Date/Time: 11/30/2020 1:29 PM  Performed by: Sonali Colbert APRN  Authorized by: Sonali Colbert APRN   Comparison: compared with previous ECG from 11/19/2019  Comparison to previous ECG: New st depression   Rhythm: sinus rhythm  Rate: normal  BPM: 79  Conduction: non-specific intraventricular conduction delay  ST Depression: II, III, aVF, V3, V4, V5 and V6  QRS axis: normal    Clinical impression: abnormal EKG                 Assessment/Plan      Diagnosis Plan   1. EKG abnormalities  Stress Test With Myocardial Perfusion One Day   2. Essential hypertension  Stress Test With Myocardial Perfusion One Day    lisinopril (PRINIVIL,ZESTRIL) 5 MG tablet    metoprolol succinate XL (TOPROL-XL) 25 MG 24 hr tablet    aspirin 81 MG EC tablet    ECG 12 Lead   3. Cardiomyopathy, unspecified type (CMS/HCC)   Stress Test With Myocardial Perfusion One Day    lisinopril (PRINIVIL,ZESTRIL) 5 MG tablet    furosemide (LASIX) 20 MG tablet    ECG 12 Lead   4. Hyperlipidemia, unspecified hyperlipidemia type  Stress Test With Myocardial Perfusion One Day    pravastatin (PRAVACHOL) 40 MG tablet   5. Bilateral carotid artery stenosis  pravastatin (PRAVACHOL) 40 MG tablet   6. Cardiomyopathy, dilated (CMS/HCC)  Stress Test With Myocardial Perfusion One Day    aspirin 81 MG EC tablet   7. Smoking         Return in about 6 months (around 5/30/2021).    EKG abnormalities/hypertension/cardiomyopathy/hyperlipidemia-patient will have a stress test.  Hypertension-patient is on lisinopril and metoprolol and per her report her blood pressure does excellent.  Cardiomyopathy-patient is on ACE, beta and diuretic.  Hyperlipidemia-patient is on pravastatin monitor by PCP.  Carotid artery disease with carotid endarterectomy-patient is on aspirin and statin.  She will continue her medication regimen.  She will follow-up in 6 months unless she has abnormal stress test and then we will see her back sooner.       Marci GONZALEZ Sade  reports that she has been smoking cigarettes. She has a 39.00 pack-year smoking history. She has never used smokeless tobacco.. I have educated her on the risk of diseases from using tobacco products such as cancer, COPD and heart disease.     I advised her to quit and she is not willing to quit.    I spent 3  minutes counseling the patient.    Patient's Body mass index is 26.52 kg/m². BMI is above normal parameters. Recommendations include: educational material and referral to primary care.    Advance Care Planning   ACP discussion was held with the patient during this visit. Patient does not have an advance directive, information provided.      Electronically signed by:

## 2020-11-30 NOTE — PATIENT INSTRUCTIONS
Smoking Tobacco Information, Adult  Smoking tobacco can be harmful to your health. Tobacco contains a poisonous (toxic), colorless chemical called nicotine. Nicotine is addictive. It changes the brain and can make it hard to stop smoking. Tobacco also has other toxic chemicals that can hurt your body and raise your risk of many cancers.  How can smoking tobacco affect me?  Smoking tobacco puts you at risk for:  · Cancer. Smoking is most commonly associated with lung cancer, but can also lead to cancer in other parts of the body.  · Chronic obstructive pulmonary disease (COPD). This is a long-term lung condition that makes it hard to breathe. It also gets worse over time.  · High blood pressure (hypertension), heart disease, stroke, or heart attack.  · Lung infections, such as pneumonia.  · Cataracts. This is when the lenses in the eyes become clouded.  · Digestive problems. This may include peptic ulcers, heartburn, and gastroesophageal reflux disease (GERD).  · Oral health problems, such as gum disease and tooth loss.  · Loss of taste and smell.  Smoking can affect your appearance by causing:  · Wrinkles.  · Yellow or stained teeth, fingers, and fingernails.  Smoking tobacco can also affect your social life, because:  · It may be challenging to find places to smoke when away from home. Many workplaces, restaurants, hotels, and public places are tobacco-free.  · Smoking is expensive. This is due to the cost of tobacco and the long-term costs of treating health problems from smoking.  · Secondhand smoke may affect those around you. Secondhand smoke can cause lung cancer, breathing problems, and heart disease. Children of smokers have a higher risk for:  ? Sudden infant death syndrome (SIDS).  ? Ear infections.  ? Lung infections.  If you currently smoke tobacco, quitting now can help you:  · Lead a longer and healthier life.  · Look, smell, breathe, and feel better over time.  · Save money.  · Protect others from the  harms of secondhand smoke.  What actions can I take to prevent health problems?  Quit smoking    · Do not start smoking. Quit if you already do.  · Make a plan to quit smoking and commit to it. Look for programs to help you and ask your health care provider for recommendations and ideas.  · Set a date and write down all the reasons you want to quit.  · Let your friends and family know you are quitting so they can help and support you. Consider finding friends who also want to quit. It can be easier to quit with someone else, so that you can support each other.  · Talk with your health care provider about using nicotine replacement medicines to help you quit, such as gum, lozenges, patches, sprays, or pills.  · Do not replace cigarette smoking with electronic cigarettes, which are commonly called e-cigarettes. The safety of e-cigarettes is not known, and some may contain harmful chemicals.  · If you try to quit but return to smoking, stay positive. It is common to slip up when you first quit, so take it one day at a time.  · Be prepared for cravings. When you feel the urge to smoke, chew gum or suck on hard candy.  Lifestyle  · Stay busy and take care of your body.  · Drink enough fluid to keep your urine pale yellow.  · Get plenty of exercise and eat a healthy diet. This can help prevent weight gain after quitting.  · Monitor your eating habits. Quitting smoking can cause you to have a larger appetite than when you smoke.  · Find ways to relax. Go out with friends or family to a movie or a restaurant where people do not smoke.  · Ask your health care provider about having regular tests (screenings) to check for cancer. This may include blood tests, imaging tests, and other tests.  · Find ways to manage your stress, such as meditation, yoga, or exercise.  Where to find support  To get support to quit smoking, consider:  · Asking your health care provider for more information and resources.  · Taking classes to learn  more about quitting smoking.  · Looking for local organizations that offer resources about quitting smoking.  · Joining a support group for people who want to quit smoking in your local community.  · Calling the smokefree.gov counselor helpline: 1-800-Quit-Now (1-618.840.6117)  Where to find more information  You may find more information about quitting smoking from:  · HelpGuide.org: www.helpguide.org  · Smokefree.gov: smokefree.gov  · American Lung Association: www.lung.org  Contact a health care provider if you:  · Have problems breathing.  · Notice that your lips, nose, or fingers turn blue.  · Have chest pain.  · Are coughing up blood.  · Feel faint or you pass out.  · Have other health changes that cause you to worry.  Summary  · Smoking tobacco can negatively affect your health, the health of those around you, your finances, and your social life.  · Do not start smoking. Quit if you already do. If you need help quitting, ask your health care provider.  · Think about joining a support group for people who want to quit smoking in your local community. There are many effective programs that will help you to quit this behavior.  This information is not intended to replace advice given to you by your health care provider. Make sure you discuss any questions you have with your health care provider.  Document Revised: 09/11/2020 Document Reviewed: 01/02/2018  Elsevier Patient Education © 2020 [employer].  BMI for Adults  What is BMI?  Body mass index (BMI) is a number that is calculated from a person's weight and height. BMI can help estimate how much of a person's weight is composed of fat. BMI does not measure body fat directly. Rather, it is an alternative to procedures that directly measure body fat, which can be difficult and expensive.  BMI can help identify people who may be at higher risk for certain medical problems.  What are BMI measurements used for?  BMI is used as a screening tool to identify  "possible weight problems. It helps determine whether a person is obese, overweight, a healthy weight, or underweight.  BMI is useful for:  · Identifying a weight problem that may be related to a medical condition or may increase the risk for medical problems.  · Promoting changes, such as changes in diet and exercise, to help reach a healthy weight. BMI screening can be repeated to see if these changes are working.  How is BMI calculated?  BMI involves measuring your weight in relation to your height. Both height and weight are measured, and the BMI is calculated from those numbers. This can be done either in English (U[place] or metric measurements. Note that charts and online BMI calculators are available to help you find your BMI quickly and easily without having to do these calculations yourself.  To calculate your BMI in English ([place] measurements:    1. Measure your weight in pounds (lb).  2. Multiply the number of pounds by 703.  ? For example, for a person who weighs 180 lb, multiply that number by 703, which equals 126,540.  3. Measure your height in inches. Then multiply that number by itself to get a measurement called \"inches squared.\"  ? For example, for a person who is 70 inches tall, the \"inches squared\" measurement is 70 inches x 70 inches, which equals 4,900 inches squared.  4. Divide the total from step 2 (number of lb x 703) by the total from step 3 (inches squared): 126,540 ÷ 4,900 = 25.8. This is your BMI.  To calculate your BMI in metric measurements:  1. Measure your weight in kilograms (kg).  2. Measure your height in meters (m). Then multiply that number by itself to get a measurement called \"meters squared.\"  ? For example, for a person who is 1.75 m tall, the \"meters squared\" measurement is 1.75 m x 1.75 m, which is equal to 3.1 meters squared.  3. Divide the number of kilograms (your weight) by the meters squared number. In this example: 70 ÷ 3.1 = 22.6. This is your BMI.  What do the " results mean?  BMI charts are used to identify whether you are underweight, normal weight, overweight, or obese. The following guidelines will be used:  · Underweight: BMI less than 18.5.  · Normal weight: BMI between 18.5 and 24.9.  · Overweight: BMI between 25 and 29.9.  · Obese: BMI of 30 or above.  Keep these notes in mind:  · Weight includes both fat and muscle, so someone with a muscular build, such as an athlete, may have a BMI that is higher than 24.9. In cases like these, BMI is not an accurate measure of body fat.  · To determine if excess body fat is the cause of a BMI of 25 or higher, further assessments may need to be done by a health care provider.  · BMI is usually interpreted in the same way for men and women.  Where to find more information  For more information about BMI, including tools to quickly calculate your BMI, go to these websites:  · Centers for Disease Control and Prevention: www.cdc.gov  · American Heart Association: www.heart.org  · National Heart, Lung, and Blood Alsen: www.nhlbi.nih.gov  Summary  · Body mass index (BMI) is a number that is calculated from a person's weight and height.  · BMI may help estimate how much of a person's weight is composed of fat. BMI can help identify those who may be at higher risk for certain medical problems.  · BMI can be measured using English measurements or metric measurements.  · BMI charts are used to identify whether you are underweight, normal weight, overweight, or obese.  This information is not intended to replace advice given to you by your health care provider. Make sure you discuss any questions you have with your health care provider.  Document Revised: 09/09/2020 Document Reviewed: 07/17/2020  Elsevier Patient Education © 2020 [employer]    Advance Care Planning and Advance Directives     You make decisions on a daily basis - decisions about where you want to live, your career, your home, your life. Perhaps one of the most  important decisions you face is your choice for future medical care. Take time to talk with your family and your healthcare team and start planning today.  Advance Care Planning is a process that can help you:  · Understand possible future healthcare decisions in light of your own experiences  · Reflect on those decision in light of your goals and values  · Discuss your decisions with those closest to you and the healthcare professionals that care for you  · Make a plan by creating a document that reflects your wishes    Surrogate Decision Maker  In the event of a medical emergency, which has left you unable to communicate or to make your own decisions, you would need someone to make decisions for you.  It is important to discuss your preferences for medical treatment with this person while you are in good health.     Qualities of a surrogate decision maker:  • Willing to take on this role and responsibility  • Knows what you want for future medical care  • Willing to follow your wishes even if they don't agree with them  • Able to make difficult medical decisions under stressful circumstances    Advance Directives  These are legal documents you can create that will guide your healthcare team and decision maker(s) when needed. These documents can be stored in the electronic medical record.    · Living Will - a legal document to guide your care if you have a terminal condition or a serious illness and are unable to communicate. States vary by statute in document names/types, but most forms may include one or more of the following:        -  Directions regarding life-prolonging treatments        -  Directions regarding artificially provided nutrition/hydration        -  Choosing a healthcare decision maker        -  Direction regarding organ/tissue donation    · Durable Power of  for Healthcare - this document names an -in-fact to make medical decisions for you, but it may also allow this person to  make personal and financial decisions for you. Please seek the advice of an  if you need this type of document.    **Advance Directives are not required and no one may discriminate against you if you do not sign one.    Medical Orders  Many states allow specific forms/orders signed by your physician to record your wishes for medical treatment in your current state of health. This form, signed in personal communication with your physician, addresses resuscitation and other medical interventions that you may or may not want.      For more information or to schedule a time with a [facility] Advance Care Planning Facilitator contact: [facility]/ACP or call [phone] and someone will contact you directly.

## 2021-01-08 ENCOUNTER — HOSPITAL ENCOUNTER (OUTPATIENT)
Dept: CARDIOLOGY | Facility: HOSPITAL | Age: 76
Discharge: HOME | End: 2021-01-08

## 2021-01-08 DIAGNOSIS — I10 ESSENTIAL HYPERTENSION: ICD-10-CM

## 2021-01-08 DIAGNOSIS — I42.9 CARDIOMYOPATHY, UNSPECIFIED TYPE (HCC): ICD-10-CM

## 2021-01-08 DIAGNOSIS — I42.0 CARDIOMYOPATHY, DILATED (HCC): ICD-10-CM

## 2021-01-08 DIAGNOSIS — E78.5 HYPERLIPIDEMIA, UNSPECIFIED HYPERLIPIDEMIA TYPE: ICD-10-CM

## 2021-01-08 DIAGNOSIS — R94.31 EKG ABNORMALITIES: ICD-10-CM

## 2021-01-08 PROCEDURE — A9500 TC99M SESTAMIBI: HCPCS | Performed by: INTERNAL MEDICINE

## 2021-01-08 PROCEDURE — 93017 CV STRESS TEST TRACING ONLY: CPT

## 2021-01-08 PROCEDURE — 78452 HT MUSCLE IMAGE SPECT MULT: CPT

## 2021-01-08 PROCEDURE — 93016 CV STRESS TEST SUPVJ ONLY: CPT | Performed by: NURSE PRACTITIONER

## 2021-01-08 PROCEDURE — 78452 HT MUSCLE IMAGE SPECT MULT: CPT | Performed by: INTERNAL MEDICINE

## 2021-01-08 PROCEDURE — 25010000002 REGADENOSON 0.4 MG/5ML SOLUTION: Performed by: INTERNAL MEDICINE

## 2021-01-08 PROCEDURE — 93018 CV STRESS TEST I&R ONLY: CPT | Performed by: INTERNAL MEDICINE

## 2021-01-08 PROCEDURE — 0 TECHNETIUM SESTAMIBI: Performed by: INTERNAL MEDICINE

## 2021-01-08 RX ADMIN — TECHNETIUM TC 99M SESTAMIBI 1 DOSE: 1 INJECTION INTRAVENOUS at 12:28

## 2021-01-08 RX ADMIN — REGADENOSON 0.4 MG: 0.08 INJECTION, SOLUTION INTRAVENOUS at 12:29

## 2021-01-08 RX ADMIN — TECHNETIUM TC 99M SESTAMIBI 1 DOSE: 1 INJECTION INTRAVENOUS at 12:29

## 2021-01-10 LAB
BH CV STRESS BP STAGE 1: NORMAL
BH CV STRESS COMMENTS STAGE 1: NORMAL
BH CV STRESS DOSE REGADENOSON STAGE 1: 0.4
BH CV STRESS DURATION MIN STAGE 1: 0
BH CV STRESS DURATION SEC STAGE 1: 10
BH CV STRESS HR STAGE 1: 107
BH CV STRESS PROTOCOL 1: NORMAL
BH CV STRESS RECOVERY BP: NORMAL MMHG
BH CV STRESS RECOVERY HR: 86 BPM
BH CV STRESS STAGE 1: 1
MAXIMAL PREDICTED HEART RATE: 145 BPM
PERCENT MAX PREDICTED HR: 73.79 %
STRESS BASELINE BP: NORMAL MMHG
STRESS BASELINE HR: 68 BPM
STRESS PERCENT HR: 87 %
STRESS POST PEAK BP: NORMAL MMHG
STRESS POST PEAK HR: 107 BPM
STRESS TARGET HR: 123 BPM

## 2021-01-11 ENCOUNTER — TELEPHONE (OUTPATIENT)
Dept: CARDIOLOGY | Facility: CLINIC | Age: 76
End: 2021-01-11

## 2021-01-11 NOTE — TELEPHONE ENCOUNTER
Advised Pt of her stress test results    Francoise Ramos / WILBER      ----- Message from KOFI Thomas sent at 1/10/2021  6:41 PM EST -----  Please advise patient.

## 2021-04-22 DIAGNOSIS — I65.23 BILATERAL CAROTID ARTERY STENOSIS: ICD-10-CM

## 2021-04-22 DIAGNOSIS — I10 ESSENTIAL HYPERTENSION: ICD-10-CM

## 2021-04-22 DIAGNOSIS — I42.9 CARDIOMYOPATHY, UNSPECIFIED TYPE (HCC): ICD-10-CM

## 2021-04-22 DIAGNOSIS — I42.0 CARDIOMYOPATHY, DILATED (HCC): ICD-10-CM

## 2021-04-22 DIAGNOSIS — E78.5 HYPERLIPIDEMIA, UNSPECIFIED HYPERLIPIDEMIA TYPE: ICD-10-CM

## 2021-04-22 RX ORDER — LISINOPRIL 5 MG/1
5 TABLET ORAL DAILY
Qty: 90 TABLET | Refills: 3 | Status: SHIPPED | OUTPATIENT
Start: 2021-04-22 | End: 2021-12-21 | Stop reason: SDUPTHER

## 2021-04-22 RX ORDER — METOPROLOL SUCCINATE 25 MG/1
25 TABLET, EXTENDED RELEASE ORAL DAILY
Qty: 90 TABLET | Refills: 2 | Status: SHIPPED | OUTPATIENT
Start: 2021-04-22 | End: 2021-12-21 | Stop reason: SDUPTHER

## 2021-04-22 RX ORDER — CLONIDINE HYDROCHLORIDE 0.1 MG/1
TABLET ORAL
Qty: 180 TABLET | Refills: 2 | Status: SHIPPED | OUTPATIENT
Start: 2021-04-22 | End: 2023-01-03 | Stop reason: SDUPTHER

## 2021-04-22 RX ORDER — FUROSEMIDE 20 MG/1
20 TABLET ORAL DAILY
Qty: 90 TABLET | Refills: 3 | Status: SHIPPED | OUTPATIENT
Start: 2021-04-22 | End: 2021-12-21 | Stop reason: SDUPTHER

## 2021-04-22 RX ORDER — PRAVASTATIN SODIUM 40 MG
40 TABLET ORAL DAILY
Qty: 90 TABLET | Refills: 3 | Status: SHIPPED | OUTPATIENT
Start: 2021-04-22 | End: 2021-12-21 | Stop reason: SDUPTHER

## 2021-04-22 RX ORDER — ASPIRIN 81 MG/1
81 TABLET ORAL DAILY
Qty: 90 TABLET | Refills: 3 | Status: SHIPPED | OUTPATIENT
Start: 2021-04-22 | End: 2021-12-21 | Stop reason: SDUPTHER

## 2021-06-01 ENCOUNTER — OFFICE VISIT (OUTPATIENT)
Dept: CARDIOLOGY | Facility: CLINIC | Age: 76
End: 2021-06-01

## 2021-06-01 VITALS
HEIGHT: 62 IN | TEMPERATURE: 97.1 F | WEIGHT: 153 LBS | SYSTOLIC BLOOD PRESSURE: 121 MMHG | DIASTOLIC BLOOD PRESSURE: 60 MMHG | BODY MASS INDEX: 28.16 KG/M2 | OXYGEN SATURATION: 98 % | HEART RATE: 76 BPM

## 2021-06-01 DIAGNOSIS — I10 ESSENTIAL HYPERTENSION: Primary | ICD-10-CM

## 2021-06-01 DIAGNOSIS — I65.23 BILATERAL CAROTID ARTERY STENOSIS: ICD-10-CM

## 2021-06-01 DIAGNOSIS — E78.5 DYSLIPIDEMIA: ICD-10-CM

## 2021-06-01 DIAGNOSIS — F17.200 SMOKING: ICD-10-CM

## 2021-06-01 DIAGNOSIS — R06.02 SOB (SHORTNESS OF BREATH) ON EXERTION: ICD-10-CM

## 2021-06-01 DIAGNOSIS — I50.20 SYSTOLIC CONGESTIVE HEART FAILURE, UNSPECIFIED HF CHRONICITY (HCC): ICD-10-CM

## 2021-06-01 DIAGNOSIS — I42.0 CARDIOMYOPATHY, DILATED (HCC): ICD-10-CM

## 2021-06-01 PROCEDURE — 99214 OFFICE O/P EST MOD 30 MIN: CPT | Performed by: NURSE PRACTITIONER

## 2021-06-01 NOTE — PROGRESS NOTES
Subjective   Marci Stuart is a 75 y.o. female     Chief Complaint   Patient presents with   • Follow-up   • ekg abnormalities       HPI    Problem List:    1. Dilated cardiomyopathy, EF initially felt to be 30 to 35%.  1.1 Most recent echocardiogram indicated , EF of 40-45%.  1.2 Low risk stress test in work-up for dilated cardiomyopathy.  1.3 Echo 6/21/17-mild LVH, EF 40-45%, diastolic dysfunction 2, moderate AR, mild MR, physiological TR, mild VT.  1.4 echo 5/19/2020-EF 56 to 60%, mild LVH, diastolic dysfunction 1, trivial to mild MR, trivial TR  1.5 stress test 1/8/2021-no evidence of ischemia, post-rest EF 58%  2. Valvular heart disease   a. Moderate MR, Mild-Mod TR, moderate AI and mild-mod PI  2. Hypertension  3. Dyslipidemia  4. Tobacco Habituation   5. Carotid Duplex 4/2/15 - 16-49% narrowing to HAZEL and LICA, antegrade flow   5.1 carotid ultrasound 6/21/17-16-49% bilateral internal carotid arteries.  To moderate carotid bifurcation disease bilaterally with no flow limiting stenosis on either side.  Antegrade flow both vertebral arteries.      5.2 carotid artery ultrasound 5/19/2020-moderate bifurcation disease bilaterally 16 to 49%, relatively discrete 50 to 75% stenosis in the proximal right turn carotid artery with 50% or less or stenosis in the proximal and mid portions of left internal carotid artery, antegrade flow both vertebral arteries       5.3 right carotid endarterectomy 8/5/2020, Dr. Amaya       5.4 CTA of the carotids 2/8/2021-mild plaque formation at the bulb of the left less than 30%, 30% stenosis in the right subclavian artery and less than 30% in the left, 0.3 cm noncalcified nodule in the left upper lobe    Patient is a 75-year-old female who presents today for follow-up.  She denies any chest pain, pressure, palpitations, fluttering, dizziness, presyncope, syncope, orthopnea, PND or edema.  She denies any shortness of breath with activity.  She says she has been having some  allergy issues lately.  She did have a repeat CT of her carotids with neurosurgery and it was very good per her report.  Patient still smokes a pack a day.    We went over stress test.    Current Outpatient Medications on File Prior to Visit   Medication Sig Dispense Refill   • aspirin 81 MG EC tablet Take 1 tablet by mouth Daily. 90 tablet 3   • Calcium Carbonate (CALTRATE 600) 1500 (600 CA) MG tablet Take 1 tablet by mouth Daily.     • cetirizine (zyrTEC) 10 MG tablet Take 10 mg by mouth Daily.     • cloNIDine (CATAPRES) 0.1 MG tablet Every 8 hours for SBP > 160 or DBP > 90 180 tablet 2   • diphenhydrAMINE (Benadryl Allergy) 25 mg capsule Take  by mouth At Night As Needed.     • lisinopril (PRINIVIL,ZESTRIL) 5 MG tablet Take 1 tablet by mouth Daily. 90 tablet 3   • metoprolol succinate XL (TOPROL-XL) 25 MG 24 hr tablet Take 1 tablet by mouth Daily. 90 tablet 2   • MULTIPLE VITAMIN PO Take  by mouth daily.     • Omega 3 1000 MG capsule Take 1,000 mg by mouth Daily. (Patient taking differently: Take 1,000 mg by mouth Daily. 2 times a week) 90 each 3   • pravastatin (PRAVACHOL) 40 MG tablet Take 1 tablet by mouth Daily. 90 tablet 3   • furosemide (LASIX) 20 MG tablet Take 1 tablet by mouth Daily. 90 tablet 3     No current facility-administered medications on file prior to visit.       ALLERGIES    Sulfa antibiotics    Past Medical History:   Diagnosis Date   • Coronary artery disease    • COVID-19 vaccine administered 1st -03/03/2021 2nd - 03/31/2021 - Moderna    • Hyperlipidemia    • Hypertension        Social History     Socioeconomic History   • Marital status:      Spouse name: Not on file   • Number of children: Not on file   • Years of education: Not on file   • Highest education level: Not on file   Tobacco Use   • Smoking status: Current Every Day Smoker     Packs/day: 1.00     Years: 39.00     Pack years: 39.00     Types: Cigarettes   • Smokeless tobacco: Never Used   Substance and Sexual Activity  "  • Alcohol use: No     Comment: A LITTLE WINE   • Drug use: Never   • Sexual activity: Defer       Family History   Problem Relation Age of Onset   • Heart attack Father    • Diabetes Other    • Hyperlipidemia Other    • Hypertension Other    • No Known Problems Mother        Review of Systems   Constitutional: Negative for appetite change, chills, fatigue and fever.   HENT: Positive for rhinorrhea (clear ). Negative for congestion and sore throat.    Eyes: Positive for itching (due to allergies ) and visual disturbance (glasses ).        Eyes are watery    Respiratory: Positive for cough (dry with phlem ). Negative for chest tightness, shortness of breath and wheezing.    Cardiovascular: Negative for chest pain, palpitations and leg swelling.   Gastrointestinal: Positive for diarrhea (at times, after she eats and drinking coffee ). Negative for abdominal pain, blood in stool, constipation, nausea and vomiting.   Endocrine: Negative for cold intolerance and heat intolerance.   Genitourinary: Negative for difficulty urinating, dysuria, frequency, hematuria and urgency.   Musculoskeletal: Positive for arthralgias (right hand ). Negative for back pain, joint swelling, neck pain and neck stiffness.   Skin: Negative for color change, pallor, rash and wound.   Allergic/Immunologic: Positive for environmental allergies (seasonal ). Negative for food allergies.   Neurological: Negative for dizziness, weakness, light-headedness, numbness and headaches.   Hematological: Bruises/bleeds easily (bruises ).   Psychiatric/Behavioral: Negative for sleep disturbance.       Objective   /60 (BP Location: Left arm)   Pulse 76   Temp 97.1 °F (36.2 °C)   Ht 157.5 cm (62\")   Wt 69.4 kg (153 lb)   SpO2 98%   BMI 27.98 kg/m²   Vitals:    06/01/21 1336   BP: 121/60   BP Location: Left arm   Pulse: 76   Temp: 97.1 °F (36.2 °C)   SpO2: 98%   Weight: 69.4 kg (153 lb)   Height: 157.5 cm (62\")      Lab Results (most recent)     None "        Physical Exam  Vitals reviewed.   Constitutional:       General: She is awake.      Appearance: Normal appearance. She is well-developed, well-groomed and overweight.   HENT:      Head: Normocephalic.   Eyes:      General: Lids are normal.      Comments: Wears glasses    Neck:      Vascular: No carotid bruit, hepatojugular reflux or JVD.   Cardiovascular:      Rate and Rhythm: Normal rate and regular rhythm.      Pulses:           Radial pulses are 2+ on the right side and 2+ on the left side.        Dorsalis pedis pulses are 2+ on the right side and 2+ on the left side.        Posterior tibial pulses are 2+ on the right side and 2+ on the left side.      Heart sounds: Normal heart sounds.   Pulmonary:      Effort: Pulmonary effort is normal.      Breath sounds: Normal breath sounds and air entry.   Abdominal:      General: Bowel sounds are normal.      Palpations: Abdomen is soft.   Musculoskeletal:      Right lower leg: No edema.      Left lower leg: No edema.   Skin:     General: Skin is warm and dry.   Neurological:      Mental Status: She is alert and oriented to person, place, and time.   Psychiatric:         Attention and Perception: Attention and perception normal.         Mood and Affect: Mood and affect normal.         Speech: Speech normal.         Behavior: Behavior normal. Behavior is cooperative.         Thought Content: Thought content normal.         Cognition and Memory: Cognition and memory normal.         Judgment: Judgment normal.         Procedure   Procedures         Assessment/Plan      Diagnosis Plan   1. Essential hypertension     2. Bilateral carotid artery stenosis     3. Cardiomyopathy, dilated (CMS/HCC)     4. Systolic congestive heart failure, unspecified HF chronicity (CMS/HCC)     5. Dyslipidemia     6. SOB (shortness of breath) on exertion     7. Smoking         Return in about 6 months (around 12/1/2021).    Hypertension-patient's doing very well on lisinopril and metoprolol.   Bilateral carotid artery disease-patient's on aspirin and statin.  Cardiomyopathy-patient's doing very well on ACE, beta and diuretic.  Dyslipidemia-patient's on pravastatin and doing well.  Shortness of breath-resolved.  Smoking-encouraged cessation.  Patient will continue her medication regimen.  She will follow-up in 6 months or sooner if any changes.       Marci CARLOS Stuart  reports that she has been smoking cigarettes. She has a 39.00 pack-year smoking history. She has never used smokeless tobacco.. Advance Care Planning   ACP discussion was declined by the patient. Patient does not have an advance directive, declines further assistance.         Electronically signed by:

## 2021-06-01 NOTE — PATIENT INSTRUCTIONS
Advance Care Planning and Advance Directives     You make decisions on a daily basis - decisions about where you want to live, your career, your home, your life. Perhaps one of the most important decisions you face is your choice for future medical care. Take time to talk with your family and your healthcare team and start planning today.  Advance Care Planning is a process that can help you:  · Understand possible future healthcare decisions in light of your own experiences  · Reflect on those decision in light of your goals and values  · Discuss your decisions with those closest to you and the healthcare professionals that care for you  · Make a plan by creating a document that reflects your wishes    Surrogate Decision Maker  In the event of a medical emergency, which has left you unable to communicate or to make your own decisions, you would need someone to make decisions for you.  It is important to discuss your preferences for medical treatment with this person while you are in good health.     Qualities of a surrogate decision maker:  • Willing to take on this role and responsibility  • Knows what you want for future medical care  • Willing to follow your wishes even if they don't agree with them  • Able to make difficult medical decisions under stressful circumstances    Advance Directives  These are legal documents you can create that will guide your healthcare team and decision maker(s) when needed. These documents can be stored in the electronic medical record.    · Living Will - a legal document to guide your care if you have a terminal condition or a serious illness and are unable to communicate. States vary by statute in document names/types, but most forms may include one or more of the following:        -  Directions regarding life-prolonging treatments        -  Directions regarding artificially provided nutrition/hydration        -  Choosing a healthcare decision maker        -  Direction  regarding organ/tissue donation    · Durable Power of  for Healthcare - this document names an -in-fact to make medical decisions for you, but it may also allow this person to make personal and financial decisions for you. Please seek the advice of an  if you need this type of document.    **Advance Directives are not required and no one may discriminate against you if you do not sign one.    Medical Orders  Many states allow specific forms/orders signed by your physician to record your wishes for medical treatment in your current state of health. This form, signed in personal communication with your physician, addresses resuscitation and other medical interventions that you may or may not want.      For more information or to schedule a time with a [facility] Advance Care Planning Facilitator contact: [email or web address] or call [phone] and someone will contact you directly.

## 2021-12-21 ENCOUNTER — LAB (OUTPATIENT)
Dept: CARDIOLOGY | Facility: CLINIC | Age: 76
End: 2021-12-21

## 2021-12-21 ENCOUNTER — OFFICE VISIT (OUTPATIENT)
Dept: CARDIOLOGY | Facility: CLINIC | Age: 76
End: 2021-12-21

## 2021-12-21 VITALS
SYSTOLIC BLOOD PRESSURE: 149 MMHG | TEMPERATURE: 97 F | WEIGHT: 135 LBS | OXYGEN SATURATION: 98 % | HEIGHT: 62 IN | DIASTOLIC BLOOD PRESSURE: 74 MMHG | HEART RATE: 82 BPM | BODY MASS INDEX: 24.84 KG/M2

## 2021-12-21 DIAGNOSIS — R06.02 SOB (SHORTNESS OF BREATH) ON EXERTION: ICD-10-CM

## 2021-12-21 DIAGNOSIS — E78.5 DYSLIPIDEMIA: ICD-10-CM

## 2021-12-21 DIAGNOSIS — F17.200 SMOKING: ICD-10-CM

## 2021-12-21 DIAGNOSIS — R07.89 CHEST TIGHTNESS: ICD-10-CM

## 2021-12-21 DIAGNOSIS — E78.5 HYPERLIPIDEMIA, UNSPECIFIED HYPERLIPIDEMIA TYPE: ICD-10-CM

## 2021-12-21 DIAGNOSIS — I42.9 CARDIOMYOPATHY, UNSPECIFIED TYPE (HCC): ICD-10-CM

## 2021-12-21 DIAGNOSIS — I65.23 BILATERAL CAROTID ARTERY STENOSIS: ICD-10-CM

## 2021-12-21 DIAGNOSIS — I10 PRIMARY HYPERTENSION: ICD-10-CM

## 2021-12-21 DIAGNOSIS — I42.0 CARDIOMYOPATHY, DILATED (HCC): ICD-10-CM

## 2021-12-21 DIAGNOSIS — R06.02 BREATH SHORTNESS: ICD-10-CM

## 2021-12-21 DIAGNOSIS — I10 PRIMARY HYPERTENSION: Primary | ICD-10-CM

## 2021-12-21 DIAGNOSIS — I50.20 SYSTOLIC CONGESTIVE HEART FAILURE, UNSPECIFIED HF CHRONICITY (HCC): ICD-10-CM

## 2021-12-21 LAB
ALBUMIN SERPL-MCNC: 4.37 G/DL (ref 3.5–5.2)
ALBUMIN/GLOB SERPL: 1.8 G/DL
ALP SERPL-CCNC: 96 U/L (ref 39–117)
ALT SERPL W P-5'-P-CCNC: 8 U/L (ref 1–33)
ANION GAP SERPL CALCULATED.3IONS-SCNC: 12.2 MMOL/L (ref 5–15)
ANISOCYTOSIS BLD QL: NORMAL
AST SERPL-CCNC: 17 U/L (ref 1–32)
BASOPHILS # BLD AUTO: 0.03 10*3/MM3 (ref 0–0.2)
BASOPHILS NFR BLD AUTO: 0.3 % (ref 0–1.5)
BILIRUB SERPL-MCNC: 0.5 MG/DL (ref 0–1.2)
BUN SERPL-MCNC: 14 MG/DL (ref 8–23)
BUN/CREAT SERPL: 21.2 (ref 7–25)
CALCIUM SPEC-SCNC: 10.4 MG/DL (ref 8.6–10.5)
CHLORIDE SERPL-SCNC: 100 MMOL/L (ref 98–107)
CHOLEST SERPL-MCNC: 149 MG/DL (ref 0–200)
CO2 SERPL-SCNC: 25.8 MMOL/L (ref 22–29)
CREAT SERPL-MCNC: 0.66 MG/DL (ref 0.57–1)
DEPRECATED RDW RBC AUTO: 54.6 FL (ref 37–54)
EOSINOPHIL # BLD AUTO: 0.07 10*3/MM3 (ref 0–0.4)
EOSINOPHIL NFR BLD AUTO: 0.8 % (ref 0.3–6.2)
ERYTHROCYTE [DISTWIDTH] IN BLOOD BY AUTOMATED COUNT: 15.3 % (ref 12.3–15.4)
GFR SERPL CREATININE-BSD FRML MDRD: 87 ML/MIN/1.73
GLOBULIN UR ELPH-MCNC: 2.4 GM/DL
GLUCOSE SERPL-MCNC: 86 MG/DL (ref 65–99)
HCT VFR BLD AUTO: 38.4 % (ref 34–46.6)
HDLC SERPL-MCNC: 32 MG/DL (ref 40–60)
HGB BLD-MCNC: 12 G/DL (ref 12–15.9)
IMM GRANULOCYTES # BLD AUTO: 0.04 10*3/MM3 (ref 0–0.05)
IMM GRANULOCYTES NFR BLD AUTO: 0.4 % (ref 0–0.5)
LARGE PLATELETS: NORMAL
LDLC SERPL CALC-MCNC: 84 MG/DL (ref 0–100)
LDLC/HDLC SERPL: 2.44 {RATIO}
LYMPHOCYTES # BLD AUTO: 1.88 10*3/MM3 (ref 0.7–3.1)
LYMPHOCYTES NFR BLD AUTO: 20.9 % (ref 19.6–45.3)
MCH RBC QN AUTO: 30 PG (ref 26.6–33)
MCHC RBC AUTO-ENTMCNC: 31.3 G/DL (ref 31.5–35.7)
MCV RBC AUTO: 96 FL (ref 79–97)
MONOCYTES # BLD AUTO: 0.77 10*3/MM3 (ref 0.1–0.9)
MONOCYTES NFR BLD AUTO: 8.6 % (ref 5–12)
NEUTROPHILS NFR BLD AUTO: 6.19 10*3/MM3 (ref 1.7–7)
NEUTROPHILS NFR BLD AUTO: 69 % (ref 42.7–76)
NRBC BLD AUTO-RTO: 0 /100 WBC (ref 0–0.2)
PLATELET # BLD AUTO: 150 10*3/MM3 (ref 140–450)
PMV BLD AUTO: 11.8 FL (ref 6–12)
POTASSIUM SERPL-SCNC: 4.3 MMOL/L (ref 3.5–5.2)
PROT SERPL-MCNC: 6.8 G/DL (ref 6–8.5)
RBC # BLD AUTO: 4 10*6/MM3 (ref 3.77–5.28)
SODIUM SERPL-SCNC: 138 MMOL/L (ref 136–145)
T4 FREE SERPL-MCNC: 1.15 NG/DL (ref 0.93–1.7)
TRIGL SERPL-MCNC: 194 MG/DL (ref 0–150)
TSH SERPL DL<=0.05 MIU/L-ACNC: 1.89 UIU/ML (ref 0.27–4.2)
VLDLC SERPL-MCNC: 33 MG/DL (ref 5–40)
WBC NRBC COR # BLD: 8.98 10*3/MM3 (ref 3.4–10.8)

## 2021-12-21 PROCEDURE — 93000 ELECTROCARDIOGRAM COMPLETE: CPT | Performed by: NURSE PRACTITIONER

## 2021-12-21 PROCEDURE — 84481 FREE ASSAY (FT-3): CPT | Performed by: NURSE PRACTITIONER

## 2021-12-21 PROCEDURE — 85007 BL SMEAR W/DIFF WBC COUNT: CPT | Performed by: NURSE PRACTITIONER

## 2021-12-21 PROCEDURE — 85025 COMPLETE CBC W/AUTO DIFF WBC: CPT | Performed by: NURSE PRACTITIONER

## 2021-12-21 PROCEDURE — 99214 OFFICE O/P EST MOD 30 MIN: CPT | Performed by: NURSE PRACTITIONER

## 2021-12-21 PROCEDURE — 80053 COMPREHEN METABOLIC PANEL: CPT | Performed by: NURSE PRACTITIONER

## 2021-12-21 PROCEDURE — 84439 ASSAY OF FREE THYROXINE: CPT | Performed by: NURSE PRACTITIONER

## 2021-12-21 PROCEDURE — 80061 LIPID PANEL: CPT | Performed by: NURSE PRACTITIONER

## 2021-12-21 PROCEDURE — 84443 ASSAY THYROID STIM HORMONE: CPT | Performed by: NURSE PRACTITIONER

## 2021-12-21 PROCEDURE — 36415 COLL VENOUS BLD VENIPUNCTURE: CPT

## 2021-12-21 RX ORDER — METOPROLOL SUCCINATE 25 MG/1
25 TABLET, EXTENDED RELEASE ORAL DAILY
Qty: 90 TABLET | Refills: 2 | Status: SHIPPED | OUTPATIENT
Start: 2021-12-21 | End: 2022-06-29 | Stop reason: SDUPTHER

## 2021-12-21 RX ORDER — LISINOPRIL 5 MG/1
5 TABLET ORAL DAILY
Qty: 90 TABLET | Refills: 3 | Status: SHIPPED | OUTPATIENT
Start: 2021-12-21 | End: 2022-06-29 | Stop reason: SDUPTHER

## 2021-12-21 RX ORDER — PRAVASTATIN SODIUM 40 MG
40 TABLET ORAL DAILY
Qty: 90 TABLET | Refills: 3 | Status: SHIPPED | OUTPATIENT
Start: 2021-12-21 | End: 2022-06-29 | Stop reason: SDUPTHER

## 2021-12-21 RX ORDER — ASPIRIN 81 MG/1
81 TABLET ORAL DAILY
Qty: 90 TABLET | Refills: 3 | Status: SHIPPED | OUTPATIENT
Start: 2021-12-21

## 2021-12-21 RX ORDER — FUROSEMIDE 20 MG/1
20 TABLET ORAL DAILY
Qty: 90 TABLET | Refills: 3 | Status: SHIPPED | OUTPATIENT
Start: 2021-12-21 | End: 2022-04-14

## 2021-12-21 NOTE — PROGRESS NOTES
Subjective   Marci Stuart is a 76 y.o. female     Chief Complaint   Patient presents with   • Follow-up   • Hypertension       HPI    Problem List:    1. Dilated cardiomyopathy, EF initially felt to be 30 to 35%.  1.1 Most recent echocardiogram indicated , EF of 40-45%.  1.2 Low risk stress test in work-up for dilated cardiomyopathy.  1.3 Echo 6/21/17-mild LVH, EF 40-45%, diastolic dysfunction 2, moderate AR, mild MR, physiological TR, mild RI.  1.4 echo 5/19/2020-EF 56 to 60%, mild LVH, diastolic dysfunction 1, trivial to mild MR, trivial TR  1.5 stress test 1/8/2021-no evidence of ischemia, post-rest EF 58%  2. Valvular heart disease   a. Moderate MR, Mild-Mod TR, moderate AI and mild-mod PI  2. Hypertension  3. Dyslipidemia  4. Tobacco Habituation   5. Carotid Duplex 4/2/15 - 16-49% narrowing to HAZEL and LICA, antegrade flow   5.1 carotid ultrasound 6/21/17-16-49% bilateral internal carotid arteries.  To moderate carotid bifurcation disease bilaterally with no flow limiting stenosis on either side.  Antegrade flow both vertebral arteries.      5.2 carotid artery ultrasound 5/19/2020-moderate bifurcation disease bilaterally 16 to 49%, relatively discrete 50 to 75% stenosis in the proximal right turn carotid artery with 50% or less or stenosis in the proximal and mid portions of left internal carotid artery, antegrade flow both vertebral arteries       5.3 right carotid endarterectomy 8/5/2020, Dr. Amaya       5.4 CTA of the carotids 2/8/2021-mild plaque formation at the bulb of the left less than 30%, 30% stenosis in the right subclavian artery and less than 30% in the left, 0.3 cm noncalcified nodule in the left upper lobe    Patient is a 76-year-old female who presents today for a follow-up.  She says she has chest discomfort that she describes as a heaviness midsternum but only when she drinks cold water.  She says it will make her short of breath.  Patient says if she walks it will go away.  She says  that if she drinks her water or any other temperature she is fine if she drinks anything else cold she has no problem is only when she drinks water.  He denies any palpitations, fluttering, dizziness, presyncope, syncope, orthopnea, PND or edema.  Patient says she does have shortness of breath only if she walks really fast and she has been doing it for a little while.  Patient says she does burp more than she used to.  She did have a stress test in January due to EKG changes which those changes are similar to last time.    Patient has not had any recent blood work.    Current Outpatient Medications on File Prior to Visit   Medication Sig Dispense Refill   • Bioflavonoid Products (BRAD C PO) Take 1,000 mg by mouth Daily.     • Calcium Carbonate (CALTRATE 600) 1500 (600 CA) MG tablet Take 1 tablet by mouth Daily.     • cloNIDine (CATAPRES) 0.1 MG tablet Every 8 hours for SBP > 160 or DBP > 90 180 tablet 2   • diphenhydrAMINE (Benadryl Allergy) 25 mg capsule Take  by mouth At Night As Needed.     • MULTIPLE VITAMIN PO Take  by mouth daily.     • [DISCONTINUED] aspirin 81 MG EC tablet Take 1 tablet by mouth Daily. 90 tablet 3   • [DISCONTINUED] furosemide (LASIX) 20 MG tablet Take 1 tablet by mouth Daily. 90 tablet 3   • [DISCONTINUED] lisinopril (PRINIVIL,ZESTRIL) 5 MG tablet Take 1 tablet by mouth Daily. 90 tablet 3   • [DISCONTINUED] metoprolol succinate XL (TOPROL-XL) 25 MG 24 hr tablet Take 1 tablet by mouth Daily. 90 tablet 2   • [DISCONTINUED] pravastatin (PRAVACHOL) 40 MG tablet Take 1 tablet by mouth Daily. 90 tablet 3   • [DISCONTINUED] cetirizine (zyrTEC) 10 MG tablet Take 10 mg by mouth Daily.     • [DISCONTINUED] Omega 3 1000 MG capsule Take 1,000 mg by mouth Daily. (Patient taking differently: Take 1,000 mg by mouth Daily. 2 times a week) 90 each 3     No current facility-administered medications on file prior to visit.       ALLERGIES    Sulfa antibiotics    Past Medical History:   Diagnosis Date   •  Coronary artery disease    • COVID-19 vaccine administered 1st -03/03/2021 2nd - 03/31/2021 - Moderna    • Hyperlipidemia    • Hypertension        Social History     Socioeconomic History   • Marital status:    Tobacco Use   • Smoking status: Current Every Day Smoker     Packs/day: 1.00     Years: 39.00     Pack years: 39.00     Types: Cigarettes   • Smokeless tobacco: Never Used   Substance and Sexual Activity   • Alcohol use: No     Comment: A LITTLE WINE   • Drug use: Never   • Sexual activity: Defer       Family History   Problem Relation Age of Onset   • Heart attack Father    • Diabetes Other    • Hyperlipidemia Other    • Hypertension Other    • No Known Problems Mother        Review of Systems   Constitutional: Negative for appetite change, chills, fatigue and fever.   HENT: Positive for nosebleeds (DUE TO SINUS ). Negative for congestion, rhinorrhea and sore throat.    Eyes: Positive for visual disturbance (GLASSES FOR READING AND DRIVING ).   Respiratory: Positive for chest tightness (WHEN SHE DRINKS COLD DRINKS SHE HAS THE CHEST TIGHTNESS AND PRESSURE ) and shortness of breath (WALKING FAST FOR A LITTLE WHILE). Negative for cough and wheezing.    Cardiovascular: Positive for chest pain (WHEN SHE DRINKS COLD WATER WILL HAVE A HEAVINESS MIDSTERNUM; MAKES SHORT OF BREATH, BUT IF SHE GETS UP AND WALKS IT GOES AWAY; SHE DRINKS IT ROOM TEMPERATURE, NO PROBLEM ). Negative for palpitations and leg swelling.   Gastrointestinal: Positive for diarrhea (ONLY WHEN SHE EATS ALOT OF SALADS ). Negative for abdominal pain, blood in stool, constipation, nausea and vomiting.        SHE BURPS MORE THAN SHE USE TO   Endocrine: Negative for cold intolerance and heat intolerance.   Genitourinary: Positive for frequency (SEVERAL TIMES IN THE DAY DUE TO DRINKING ALOT OF COFFEE ). Negative for difficulty urinating, dysuria, hematuria and urgency.   Musculoskeletal: Positive for arthralgias (LEGS ). Negative for back  "pain, joint swelling, neck pain and neck stiffness.   Skin: Negative for color change, pallor, rash and wound.   Allergic/Immunologic: Negative for environmental allergies and food allergies.   Neurological: Positive for weakness (SHE STATES SHE HAS LOST STRENGHT WHEN SHE GOES TO LIFT THINGS ). Negative for dizziness, light-headedness, numbness and headaches.   Hematological: Bruises/bleeds easily (BRUISES AND BLEEDS ).   Psychiatric/Behavioral: Negative for sleep disturbance.       Objective   /74 (BP Location: Left arm)   Pulse 82   Temp 97 °F (36.1 °C)   Ht 157.5 cm (62\")   Wt 61.2 kg (135 lb)   SpO2 98%   BMI 24.69 kg/m²   Vitals:    12/21/21 1324   BP: 149/74   BP Location: Left arm   Pulse: 82   Temp: 97 °F (36.1 °C)   SpO2: 98%   Weight: 61.2 kg (135 lb)   Height: 157.5 cm (62\")      Lab Results (most recent)     None        Physical Exam  Vitals reviewed.   Constitutional:       General: She is awake.      Appearance: Normal appearance. She is well-developed, well-groomed and normal weight.   HENT:      Head: Normocephalic.   Eyes:      General: Lids are normal.      Comments: Wears glasses   Neck:      Vascular: No carotid bruit, hepatojugular reflux or JVD.   Cardiovascular:      Rate and Rhythm: Normal rate and regular rhythm.      Pulses:           Radial pulses are 2+ on the right side and 2+ on the left side.        Dorsalis pedis pulses are 2+ on the right side and 2+ on the left side.        Posterior tibial pulses are 2+ on the right side and 2+ on the left side.      Heart sounds: Normal heart sounds.   Pulmonary:      Effort: Pulmonary effort is normal.      Breath sounds: Normal breath sounds and air entry.   Abdominal:      General: Bowel sounds are normal.      Palpations: Abdomen is soft.   Musculoskeletal:      Right lower leg: No edema.      Left lower leg: No edema.   Skin:     General: Skin is warm and dry.   Neurological:      Mental Status: She is alert and oriented to " person, place, and time.   Psychiatric:         Attention and Perception: Attention and perception normal.         Mood and Affect: Mood and affect normal.         Speech: Speech normal.         Behavior: Behavior normal. Behavior is cooperative.         Thought Content: Thought content normal.         Cognition and Memory: Cognition and memory normal.         Judgment: Judgment normal.         Procedure     ECG 12 Lead    Date/Time: 12/21/2021 1:59 PM  Performed by: Sonali Colbert APRN  Authorized by: Sonali Colbert APRN   Comparison: compared with previous ECG from 11/30/2020  Similar to previous ECG  Rhythm: sinus rhythm and sinus arrhythmia  Rate: normal  BPM: 78  Conduction: incomplete left bundle branch block  T elevation: II, III, aVF, V4, V5, V6 and V3  QRS axis: normal    Clinical impression: abnormal EKG                 Assessment/Plan      Diagnosis Plan   1. Primary hypertension  lisinopril (PRINIVIL,ZESTRIL) 5 MG tablet    metoprolol succinate XL (TOPROL-XL) 25 MG 24 hr tablet    Comprehensive Metabolic Panel    TSH    T3, Free    T4, Free    CBC & Differential   2. Bilateral carotid artery stenosis  pravastatin (PRAVACHOL) 40 MG tablet    Lipid Panel   3. Systolic congestive heart failure, unspecified HF chronicity (HCC)     4. Dyslipidemia  Lipid Panel   5. SOB (shortness of breath) on exertion     6. Cardiomyopathy, dilated (HCC)  aspirin 81 MG EC tablet   7. Breath shortness     8. Smoking     9. Cardiomyopathy, unspecified type (HCC)  furosemide (LASIX) 20 MG tablet    lisinopril (PRINIVIL,ZESTRIL) 5 MG tablet    metoprolol succinate XL (TOPROL-XL) 25 MG 24 hr tablet   10. Hyperlipidemia, unspecified hyperlipidemia type  pravastatin (PRAVACHOL) 40 MG tablet   11. Chest tightness         Return in about 6 months (around 6/21/2022).    Primary hypertension-patient is on lisinopril and metoprolol and doing well.  She had a couple cups of coffee today.  Bilateral carotid artery disease-patient's on  aspirin and statin.  Systolic heart failure-patient uses Lasix and has no signs of failure.  Dyslipidemia-patient's on pravastatin.  Shortness of breath-stable.  Cardiomyopathy-patient's on beta-blocker and ACE.  Shortness of breath-stable.  Smoking-encourage cessation.  Hyperlipidemia-patient's on pravastatin.  Chest tightness-patient feels like this is directly related to only drinking cold water.  She does not feel like additional work-up is needed at this time.  She was encouraged if it changes progresses to contact her office so that we can repeat testing.  She will continue medication regimen for now.  She will follow-up in 6 months or sooner if any changes.       Marci GONZALEZ Sade  reports that she has been smoking cigarettes. She has a 39.00 pack-year smoking history. She has never used smokeless tobacco..Advance Care Planning   ACP discussion was declined by the patient. Patient does not have an advance directive, declines further assistance. Patient brought in medicine list to appointment, it's been reviewed with patient and med list was updated in the chart.     Electronically signed by:

## 2021-12-22 ENCOUNTER — TELEPHONE (OUTPATIENT)
Dept: CARDIOLOGY | Facility: CLINIC | Age: 76
End: 2021-12-22

## 2021-12-22 LAB — T3FREE SERPL-MCNC: 2.87 PG/ML (ref 2–4.4)

## 2021-12-22 NOTE — TELEPHONE ENCOUNTER
----- Message from KOFI Thomas sent at 12/22/2021  6:28 AM EST -----  Please advise patient.  LDL is excellent.  Trig just slightly high, breads/pastas/carbs/sweets.  Forwarded to PCP.

## 2021-12-22 NOTE — TELEPHONE ENCOUNTER
Unable to leave pt mess regarding lab results due to Voice mail says enter remote access code .     Pt will need to be made aware of lab results ( WNL) except for her Triglycerides 194 she will need to watch her carbs, sweets, and bread     Lab results   T3, Free   2.00 - 4.40 pg/mL 2.87      Hemoglobin   12.0 - 15.9 g/dL 12.0    Hematocrit   34.0 - 46.6 % 38.4      Anisocytosis   None Seen Slight/1+    Large Platelets   None Seen Slight/     TSH   0.270 - 4.200 uIU/mL 1.890      Free T4   0.93 - 1.70 ng/dL 1.15      Creatinine   0.57 - 1.00 mg/dL 0.66    Sodium   136 - 145 mmol/L 138    Potassium   3.5 - 5.2 mmol/L 4.3      Total Cholesterol   0 - 200 mg/dL 149    Triglycerides   0 - 150 mg/dL 194 High     HDL Cholesterol   40 - 60 mg/dL 32 Low

## 2021-12-23 ENCOUNTER — TELEPHONE (OUTPATIENT)
Dept: CARDIOLOGY | Facility: CLINIC | Age: 76
End: 2021-12-23

## 2021-12-23 NOTE — TELEPHONE ENCOUNTER
Patient called said that she received a call today but missed it she thought it might be about upcoming apt.    Called patient back and advised her next scheduled apt is 6/29/22 @ 1:15

## 2022-04-14 DIAGNOSIS — I42.9 CARDIOMYOPATHY, UNSPECIFIED TYPE: ICD-10-CM

## 2022-04-14 RX ORDER — FUROSEMIDE 20 MG/1
TABLET ORAL
Qty: 90 TABLET | Refills: 3 | Status: SHIPPED | OUTPATIENT
Start: 2022-04-14 | End: 2023-01-03 | Stop reason: SDUPTHER

## 2022-06-29 ENCOUNTER — OFFICE VISIT (OUTPATIENT)
Dept: CARDIOLOGY | Facility: CLINIC | Age: 77
End: 2022-06-29

## 2022-06-29 VITALS
WEIGHT: 135 LBS | OXYGEN SATURATION: 97 % | HEIGHT: 61 IN | SYSTOLIC BLOOD PRESSURE: 139 MMHG | HEART RATE: 73 BPM | BODY MASS INDEX: 25.49 KG/M2 | DIASTOLIC BLOOD PRESSURE: 65 MMHG | TEMPERATURE: 96.9 F

## 2022-06-29 DIAGNOSIS — R93.89 ABNORMAL ULTRASOUND: ICD-10-CM

## 2022-06-29 DIAGNOSIS — I50.20 SYSTOLIC CONGESTIVE HEART FAILURE, UNSPECIFIED HF CHRONICITY: ICD-10-CM

## 2022-06-29 DIAGNOSIS — R09.89 DECREASED PEDAL PULSES: ICD-10-CM

## 2022-06-29 DIAGNOSIS — I65.23 BILATERAL CAROTID ARTERY STENOSIS: ICD-10-CM

## 2022-06-29 DIAGNOSIS — R93.6 ABNORMAL ULTRASOUND OF LOWER EXTREMITY: ICD-10-CM

## 2022-06-29 DIAGNOSIS — I73.9 PVD (PERIPHERAL VASCULAR DISEASE): ICD-10-CM

## 2022-06-29 DIAGNOSIS — I42.0 CARDIOMYOPATHY, DILATED: Primary | ICD-10-CM

## 2022-06-29 DIAGNOSIS — I70.211 ATHEROSCLEROSIS OF NATIVE ARTERIES OF EXTREMITIES WITH INTERMITTENT CLAUDICATION, RIGHT LEG: ICD-10-CM

## 2022-06-29 DIAGNOSIS — E78.5 HYPERLIPIDEMIA, UNSPECIFIED HYPERLIPIDEMIA TYPE: ICD-10-CM

## 2022-06-29 DIAGNOSIS — F17.200 SMOKING: ICD-10-CM

## 2022-06-29 DIAGNOSIS — I73.9 CLAUDICATION: ICD-10-CM

## 2022-06-29 DIAGNOSIS — R06.02 SOB (SHORTNESS OF BREATH) ON EXERTION: ICD-10-CM

## 2022-06-29 DIAGNOSIS — I10 PRIMARY HYPERTENSION: ICD-10-CM

## 2022-06-29 PROCEDURE — 99214 OFFICE O/P EST MOD 30 MIN: CPT | Performed by: NURSE PRACTITIONER

## 2022-06-29 RX ORDER — FEXOFENADINE HCL 180 MG/1
180 TABLET ORAL DAILY
COMMUNITY
End: 2023-01-03 | Stop reason: ALTCHOICE

## 2022-06-29 RX ORDER — LISINOPRIL 5 MG/1
5 TABLET ORAL DAILY
Qty: 90 TABLET | Refills: 3 | Status: SHIPPED | OUTPATIENT
Start: 2022-06-29 | End: 2022-06-29 | Stop reason: ALTCHOICE

## 2022-06-29 RX ORDER — METOPROLOL SUCCINATE 25 MG/1
25 TABLET, EXTENDED RELEASE ORAL DAILY
Qty: 90 TABLET | Refills: 3 | Status: SHIPPED | OUTPATIENT
Start: 2022-06-29 | End: 2023-01-03 | Stop reason: SDUPTHER

## 2022-06-29 RX ORDER — PRAVASTATIN SODIUM 40 MG
40 TABLET ORAL DAILY
Qty: 90 TABLET | Refills: 3 | Status: SHIPPED | OUTPATIENT
Start: 2022-06-29 | End: 2023-01-03 | Stop reason: SDUPTHER

## 2022-06-29 RX ORDER — LOSARTAN POTASSIUM 50 MG/1
50 TABLET ORAL DAILY
Qty: 90 TABLET | Refills: 3 | Status: SHIPPED | OUTPATIENT
Start: 2022-06-29 | End: 2023-01-03 | Stop reason: SDUPTHER

## 2022-08-03 ENCOUNTER — TELEPHONE (OUTPATIENT)
Dept: CARDIOLOGY | Facility: CLINIC | Age: 77
End: 2022-08-03

## 2022-08-03 DIAGNOSIS — I73.9 ASYMPTOMATIC PVD (PERIPHERAL VASCULAR DISEASE): Primary | ICD-10-CM

## 2022-08-03 DIAGNOSIS — R09.89 DECREASED PEDAL PULSES: ICD-10-CM

## 2022-08-03 DIAGNOSIS — I73.9 PVD (PERIPHERAL VASCULAR DISEASE): ICD-10-CM

## 2022-08-03 DIAGNOSIS — R93.6 ABNORMAL ULTRASOUND OF LOWER EXTREMITY: ICD-10-CM

## 2022-08-03 DIAGNOSIS — I10 PRIMARY HYPERTENSION: ICD-10-CM

## 2022-08-03 DIAGNOSIS — R93.89 ABNORMAL ULTRASOUND: ICD-10-CM

## 2022-08-03 DIAGNOSIS — I70.211 ATHEROSCLEROSIS OF NATIVE ARTERIES OF EXTREMITIES WITH INTERMITTENT CLAUDICATION, RIGHT LEG: ICD-10-CM

## 2022-08-03 NOTE — TELEPHONE ENCOUNTER
----- Message from KOFI Thomas sent at 8/3/2022 11:57 AM EDT -----  Needs CTA of abd with run off.  She is on ASA and statin. Last Cr was good.  Will need a BMP as well.        Unable to reach pt regarding the above mess / test results . I will try again tomorrow . If pt calls back please advise that she will need a Ct of the Abd . And to continue taking her ASA and Statins if pt is willing to proceed with testing ( testing pending and labs pending  )       U/S Results - Significant peripheral vascular disease at the level of the distal right superficial femoral artery .      WILBER Wadsworth    Pt was notified of the above mess and she is willing to proceed with CTA Abd with run off pt advised that someone will contact her with apt date and time referral sent     WILBER Wadsworth  08/04/2022 - 12:55 pm

## 2022-09-07 ENCOUNTER — TELEPHONE (OUTPATIENT)
Dept: CARDIOLOGY | Facility: CLINIC | Age: 77
End: 2022-09-07

## 2022-09-07 DIAGNOSIS — R09.89 DECREASED PEDAL PULSES: ICD-10-CM

## 2022-09-07 DIAGNOSIS — I73.9 ASYMPTOMATIC PVD (PERIPHERAL VASCULAR DISEASE): Primary | ICD-10-CM

## 2022-09-07 DIAGNOSIS — R93.89 ABNORMAL CT SCAN: ICD-10-CM

## 2022-09-07 DIAGNOSIS — I10 PRIMARY HYPERTENSION: ICD-10-CM

## 2022-09-07 DIAGNOSIS — R93.89 ABNORMAL ULTRASOUND: ICD-10-CM

## 2022-09-07 NOTE — TELEPHONE ENCOUNTER
----- Message from KOFI Thomas sent at 9/6/2022 10:19 AM EDT -----  Regarding: FW:  Please advise patient.  Refer to Dr Han in [place] or Dr Baird in [place] for abnormal Ct, PAD.  Patients preference.  She would be on ASA or Plavix and statin. Also have her fu with PCP re the diverticula and send copy to PCP as well.  Thanks!  ----- Message -----  From: Kaela Pineda RegSched Rep  Sent: 9/6/2022  10:02 AM EDT  To: KOFI Thomas     Yes

## 2022-09-07 NOTE — TELEPHONE ENCOUNTER
PATIENT HAS BEEN NOTIFIED ON ASA AND STATIN. SHE STATES DOES NOT WANT TO BE REFERRED AT THIS TIME.    RESULTS SENT TO PCP.

## 2022-09-08 NOTE — TELEPHONE ENCOUNTER
Pt called and prefers to stay local wants Referral with Dr. Baird.    Chloe Aranda, [facility] Rep

## 2023-01-03 ENCOUNTER — OFFICE VISIT (OUTPATIENT)
Dept: CARDIOLOGY | Facility: CLINIC | Age: 78
End: 2023-01-03
Payer: MEDICARE

## 2023-01-03 VITALS
HEART RATE: 87 BPM | WEIGHT: 133 LBS | BODY MASS INDEX: 25.11 KG/M2 | DIASTOLIC BLOOD PRESSURE: 74 MMHG | OXYGEN SATURATION: 97 % | HEIGHT: 61 IN | TEMPERATURE: 97.6 F | SYSTOLIC BLOOD PRESSURE: 155 MMHG

## 2023-01-03 DIAGNOSIS — R00.2 PALPITATIONS: ICD-10-CM

## 2023-01-03 DIAGNOSIS — I42.0 CARDIOMYOPATHY, DILATED: ICD-10-CM

## 2023-01-03 DIAGNOSIS — I65.23 BILATERAL CAROTID ARTERY STENOSIS: ICD-10-CM

## 2023-01-03 DIAGNOSIS — I51.89 GRADE I DIASTOLIC DYSFUNCTION: ICD-10-CM

## 2023-01-03 DIAGNOSIS — J06.9 UPPER RESPIRATORY TRACT INFECTION, UNSPECIFIED TYPE: ICD-10-CM

## 2023-01-03 DIAGNOSIS — I73.9 PVD (PERIPHERAL VASCULAR DISEASE): ICD-10-CM

## 2023-01-03 DIAGNOSIS — E78.5 HYPERLIPIDEMIA, UNSPECIFIED HYPERLIPIDEMIA TYPE: ICD-10-CM

## 2023-01-03 DIAGNOSIS — I10 PRIMARY HYPERTENSION: Primary | ICD-10-CM

## 2023-01-03 PROCEDURE — 99214 OFFICE O/P EST MOD 30 MIN: CPT | Performed by: NURSE PRACTITIONER

## 2023-01-03 PROCEDURE — 93000 ELECTROCARDIOGRAM COMPLETE: CPT | Performed by: NURSE PRACTITIONER

## 2023-01-03 PROCEDURE — 1159F MED LIST DOCD IN RCRD: CPT | Performed by: NURSE PRACTITIONER

## 2023-01-03 PROCEDURE — 1160F RVW MEDS BY RX/DR IN RCRD: CPT | Performed by: NURSE PRACTITIONER

## 2023-01-03 RX ORDER — LOSARTAN POTASSIUM 100 MG/1
100 TABLET ORAL DAILY
Qty: 90 TABLET | Refills: 3 | Status: SHIPPED | OUTPATIENT
Start: 2023-01-03

## 2023-01-03 RX ORDER — METOPROLOL SUCCINATE 25 MG/1
25 TABLET, EXTENDED RELEASE ORAL DAILY
Qty: 90 TABLET | Refills: 3 | Status: SHIPPED | OUTPATIENT
Start: 2023-01-03

## 2023-01-03 RX ORDER — CLOPIDOGREL BISULFATE 75 MG/1
75 TABLET ORAL DAILY
COMMUNITY
End: 2023-01-03 | Stop reason: SDUPTHER

## 2023-01-03 RX ORDER — CLOPIDOGREL BISULFATE 75 MG/1
75 TABLET ORAL DAILY
Qty: 90 TABLET | Refills: 3 | Status: SHIPPED | OUTPATIENT
Start: 2023-01-03

## 2023-01-03 RX ORDER — PSYLLIUM SEED (WITH DEXTROSE)
POWDER (GRAM) ORAL DAILY
COMMUNITY

## 2023-01-03 RX ORDER — FUROSEMIDE 20 MG/1
20 TABLET ORAL DAILY
Qty: 90 TABLET | Refills: 3 | Status: SHIPPED | OUTPATIENT
Start: 2023-01-03

## 2023-01-03 RX ORDER — AZITHROMYCIN 250 MG/1
TABLET, FILM COATED ORAL
Qty: 6 TABLET | Refills: 0 | Status: SHIPPED | OUTPATIENT
Start: 2023-01-03

## 2023-01-03 RX ORDER — CLONIDINE HYDROCHLORIDE 0.1 MG/1
TABLET ORAL
Qty: 180 TABLET | Refills: 3 | Status: SHIPPED | OUTPATIENT
Start: 2023-01-03

## 2023-01-03 RX ORDER — PRAVASTATIN SODIUM 40 MG
40 TABLET ORAL DAILY
Qty: 90 TABLET | Refills: 3 | Status: SHIPPED | OUTPATIENT
Start: 2023-01-03

## 2023-01-03 RX ORDER — LOSARTAN POTASSIUM 100 MG/1
100 TABLET ORAL DAILY
Qty: 90 TABLET | Refills: 3
Start: 2023-01-03 | End: 2023-01-03 | Stop reason: SDUPTHER

## 2023-01-03 RX ORDER — LORATADINE 10 MG/1
10 TABLET ORAL DAILY
COMMUNITY

## 2023-01-03 NOTE — PROGRESS NOTES
Subjective   Marci Stuart is a 77 y.o. female     Chief Complaint   Patient presents with   • Follow-up       HPI    Problem List:    1. Dilated cardiomyopathy, EF initially felt to be 30 to 35%.  1.1 Most recent echocardiogram indicated , EF of 40-45%.  1.2 Low risk stress test in work-up for dilated cardiomyopathy.  1.3 Echo 6/21/17-mild LVH, EF 40-45%, diastolic dysfunction 2, moderate AR, mild MR, physiological TR, mild IL.  1.4 echo 5/19/2020-EF 56 to 60%, mild LVH, diastolic dysfunction 1, trivial to mild MR, trivial TR  1.5 stress test 1/8/2021-no evidence of ischemia, post-rest EF 58%  2. Valvular heart disease   a. Moderate MR, Mild-Mod TR, moderate AI and mild-mod PI  2. Hypertension  3. Dyslipidemia  4. Tobacco Habituation   5. Carotid Duplex 4/2/15 - 16-49% narrowing to HAZEL and LICA, antegrade flow   5.1 carotid ultrasound 6/21/17-16-49% bilateral internal carotid arteries.  To moderate carotid bifurcation disease bilaterally with no flow limiting stenosis on either side.  Antegrade flow both vertebral arteries.      5.2 carotid artery ultrasound 5/19/2020-moderate bifurcation disease bilaterally 16 to 49%, relatively discrete 50 to 75% stenosis in the proximal right turn carotid artery with 50% or less or stenosis in the proximal and mid portions of left internal carotid artery, antegrade flow both vertebral arteries       5.3 right carotid endarterectomy 8/5/2020, Dr. Amaya       5.4 CTA of the carotids 2/8/2021-mild plaque formation at the bulb of the left less than 30%, 30% stenosis in the right subclavian artery and less than 30% in the left, 0.3 cm noncalcified nodule in the left upper lobe       6. PVD       6.1 BLE Arterial US 07/28/2022 -findings compatible with hemodynamic significant peripheral vascular disease at the level of the distal right superficial femoral artery, consider CT angiography       6.2 CTA of abdomen with runoff 8/31/2022-high-grade significant stenosis distal  right superficial femoral artery with stenosis estimated 95%, no other areas of high-grade stenosis, prominent diverticulosis left and sigmoid colon with mild inflammatory changes (referred to Dr. Baird)       6.3 RLE Angioplasty 10/27/2022 with Dr. Baird, on Plavix and ASA    Patient is a 77-year-old female who presents today for follow-up on testing.  She denies any chest pain, pressure, dizziness, presyncope, syncope, orthopnea, PND or edema.  She says she has palpitations sometimes she lays on her left side but she says this does not bother her.  She says she only gets short of breath if she is going up a hill or walking long distance and goes up a hill then she might have to stop.  She says this has not changed.  Patient did have angioplasty to her right lower extremity with Dr. Baird and she says she has been doing very good since.    We will over the fact that she had significant plaque in her right lower extremity and she has had abnormal EKG for the past at least 3 EKGs.  She did have a stress test after the first findings of the abnormalities and it was normal.  However I wonder if it is not a false normal.  I recommended patient repeat a stress test because of known significant PVD but this time she feels like she is doing well and does not want to do so.    Current Outpatient Medications on File Prior to Visit   Medication Sig Dispense Refill   • aspirin 81 MG EC tablet Take 1 tablet by mouth Daily. 90 tablet 3   • Bioflavonoid Products (BRAD C PO) Take 1,000 mg by mouth Daily.     • Calcium Carbonate 1500 (600 Ca) MG tablet Take 1 tablet by mouth Daily.     • diphenhydrAMINE (BENADRYL) 25 mg capsule Take  by mouth At Night As Needed.     • loratadine (Claritin) 10 MG tablet Take 10 mg by mouth Daily.     • MULTIPLE VITAMIN PO Take  by mouth daily.     • NON FORMULARY Hair skin and nails vitamins - 1 tablet daily     • Psyllium (Metamucil) wafer wafer Take  by mouth Daily.     • [DISCONTINUED] cloNIDine  (CATAPRES) 0.1 MG tablet Every 8 hours for SBP > 160 or DBP > 90 180 tablet 2   • [DISCONTINUED] clopidogrel (PLAVIX) 75 MG tablet Take 75 mg by mouth Daily.     • [DISCONTINUED] furosemide (LASIX) 20 MG tablet TAKE ONE TABLET BY MOUTH EVERY DAY 90 tablet 3   • [DISCONTINUED] losartan (Cozaar) 50 MG tablet Take 1 tablet by mouth Daily. (Patient taking differently: Take 100 mg by mouth Daily.) 90 tablet 3   • [DISCONTINUED] metoprolol succinate XL (TOPROL-XL) 25 MG 24 hr tablet Take 1 tablet by mouth Daily. 90 tablet 3   • [DISCONTINUED] pravastatin (PRAVACHOL) 40 MG tablet Take 1 tablet by mouth Daily. 90 tablet 3   • [DISCONTINUED] fexofenadine (ALLEGRA) 180 MG tablet Take 180 mg by mouth Daily.       No current facility-administered medications on file prior to visit.       ALLERGIES    Lisinopril and Sulfa antibiotics    Past Medical History:   Diagnosis Date   • Coronary artery disease    • COVID-19 11/01/2022   • COVID-19 vaccine administered 1st -03/03/2021 2nd - 03/31/2021 - Moderna    • Hyperlipidemia    • Hypertension        Social History     Socioeconomic History   • Marital status:    Tobacco Use   • Smoking status: Every Day     Packs/day: 1.00     Years: 39.00     Pack years: 39.00     Types: Cigarettes   • Smokeless tobacco: Never   Vaping Use   • Vaping Use: Never used   Substance and Sexual Activity   • Alcohol use: No     Comment: A LITTLE WINE   • Drug use: Never   • Sexual activity: Defer       Family History   Problem Relation Age of Onset   • Heart attack Father    • Diabetes Other    • Hyperlipidemia Other    • Hypertension Other    • No Known Problems Mother        Review of Systems   Constitutional: Negative for appetite change, diaphoresis, fatigue and fever.   HENT: Positive for rhinorrhea (right side only ). Negative for congestion and sore throat.    Eyes: Positive for discharge (right eye bhatt) and visual disturbance (glasses ( reading and driving ) ).   Respiratory:  Positive for cough (dry cough ) and shortness of breath (going up hills or when she is really busy; long distance/hill has to stop ). Negative for chest tightness and wheezing.    Cardiovascular: Positive for palpitations (when she lays on the left side she can hear her heart rate speeding up; does not bother her ). Negative for chest pain and leg swelling.   Gastrointestinal: Negative for abdominal pain, blood in stool, constipation, diarrhea, nausea and vomiting.   Endocrine: Negative for cold intolerance and heat intolerance.   Genitourinary: Positive for frequency (several times a day and night ). Negative for difficulty urinating, dysuria, hematuria and urgency.   Musculoskeletal: Positive for arthralgias (hands and fingers ). Negative for back pain, joint swelling, neck pain and neck stiffness.   Skin: Negative for color change, pallor, rash and wound.   Allergic/Immunologic: Negative for environmental allergies and food allergies.   Neurological: Negative for dizziness, syncope, weakness, light-headedness, numbness and headaches.   Hematological: Bruises/bleeds easily (Bruises and bleeds easy ).   Psychiatric/Behavioral: Negative for sleep disturbance.       Objective   /74 (BP Location: Left arm, Patient Position: Sitting)   Pulse 87   Temp 97.6 °F (36.4 °C)   Ht 154.9 cm (61\")   Wt 60.3 kg (133 lb)   SpO2 97%   BMI 25.13 kg/m²   Vitals:    01/03/23 1352   BP: 155/74   BP Location: Left arm   Patient Position: Sitting   Pulse: 87   Temp: 97.6 °F (36.4 °C)   SpO2: 97%   Weight: 60.3 kg (133 lb)   Height: 154.9 cm (61\")      Lab Results (most recent)     None        Physical Exam  Vitals reviewed.   Constitutional:       General: She is awake.      Appearance: Normal appearance. She is well-developed, well-groomed and normal weight.   HENT:      Head: Normocephalic.   Eyes:      General: Lids are normal.      Comments: Wears glasses    Neck:      Vascular: No carotid bruit, hepatojugular reflux or  JVD.   Cardiovascular:      Rate and Rhythm: Normal rate and regular rhythm.      Pulses:           Radial pulses are 2+ on the right side and 2+ on the left side.        Dorsalis pedis pulses are 2+ on the right side and 2+ on the left side.        Posterior tibial pulses are 2+ on the right side and 2+ on the left side.      Heart sounds: Normal heart sounds.   Pulmonary:      Effort: Pulmonary effort is normal.      Breath sounds: Normal breath sounds and air entry.   Abdominal:      General: Bowel sounds are normal.      Palpations: Abdomen is soft.   Musculoskeletal:      Right lower leg: No edema.      Left lower leg: No edema.   Skin:     General: Skin is warm and dry.   Neurological:      Mental Status: She is alert and oriented to person, place, and time.   Psychiatric:         Attention and Perception: Attention and perception normal.         Mood and Affect: Mood and affect normal.         Speech: Speech normal.         Behavior: Behavior normal. Behavior is cooperative.         Thought Content: Thought content normal.         Cognition and Memory: Cognition and memory normal.         Judgment: Judgment normal.         Procedure     ECG 12 Lead    Date/Time: 1/3/2023 2:12 PM  Performed by: Sonali Hart APRN  Authorized by: Sonali Hart APRN   Comparison: compared with previous ECG from 12/21/2021  Similar to previous ECG  Rhythm: sinus rhythm  Rate: normal  BPM: 88  Conduction: non-specific intraventricular conduction delay  ST Depression: II, III, aVF, V3, V4, V5 and V6  QRS axis: normal    Clinical impression: abnormal EKG                 Assessment & Plan      Diagnosis Plan   1. Primary hypertension  metoprolol succinate XL (TOPROL-XL) 25 MG 24 hr tablet    losartan (Cozaar) 100 MG tablet    cloNIDine (CATAPRES) 0.1 MG tablet    ECG 12 Lead      2. Bilateral carotid artery stenosis  pravastatin (PRAVACHOL) 40 MG tablet      3. Hyperlipidemia, unspecified hyperlipidemia type  pravastatin  (PRAVACHOL) 40 MG tablet      4. Cardiomyopathy, dilated (HCC)  metoprolol succinate XL (TOPROL-XL) 25 MG 24 hr tablet      5. Palpitations  ECG 12 Lead      6. PVD (peripheral vascular disease) (HCC)  clopidogrel (PLAVIX) 75 MG tablet      7. Grade I diastolic dysfunction  furosemide (LASIX) 20 MG tablet          Return in about 6 months (around 7/3/2023).    Hypertension-patient is on losartan and metoprolol.  She says that her blood pressure has been fine but she has been running around today and that is why it is elevated.  Carotid artery disease-patient's on aspirin and statin.  Hyperlipidemia-patient is on statin and monitored by PCP.  Cardiomyopathy-patient's on beta-blocker and ARB as well as diuretic.  Palpitations-stable on beta-blocker.  PVD-patient's on aspirin, statin and Plavix.  Diastolic dysfunction-patient is Lasix as needed with no signs of failure.  She will continue her medication regimen.  She will follow-up in 6 months or sooner if any changes.       Marci Stuart  reports that she has been smoking cigarettes. She has a 39.00 pack-year smoking history. She has never used smokeless tobacco.. I have educated her on the risk of diseases from using tobacco products such as cancer, COPD and heart disease.     I advised her to quit and she is not willing to quit.    I spent 3  minutes counseling the patient.         Advance Care Planning   ACP discussion was declined by the patient. Patient does not have an advance directive, declines further assistance.   Patient brought in medicine list to appointment, it's been reviewed with patient and med list was updated in the chart.          Electronically signed by:

## 2023-01-03 NOTE — LETTER
January 3, 2023     Coco Kasper DO  [address]  [address]  [facility] [address]    Patient: Marci Stuart   YOB: 1945   Date of Visit: 1/3/2023       Dear Dr. Ranjith DO:    Thank you for referring Marci Stuart to me for evaluation. Below are the relevant portions of my assessment and plan of care.    If you have questions, please do not hesitate to call me. I look forward to following Marci along with you.         Sincerely,        KOFI Melton        CC: No Recipients  Sonali Hart APRN  01/03/23 1430  Signed  Subjective    Marci Stuart is a 77 y.o. female     Chief Complaint   Patient presents with   • Follow-up       HPI    Problem List:    1. Dilated cardiomyopathy, EF initially felt to be 30 to 35%.  1.1 Most recent echocardiogram indicated , EF of 40-45%.  1.2 Low risk stress test in work-up for dilated cardiomyopathy.  1.3 Echo 6/21/17-mild LVH, EF 40-45%, diastolic dysfunction 2, moderate AR, mild MR, physiological TR, mild VT.  1.4 echo 5/19/2020-EF 56 to 60%, mild LVH, diastolic dysfunction 1, trivial to mild MR, trivial TR  1.5 stress test 1/8/2021-no evidence of ischemia, post-rest EF 58%  2. Valvular heart disease   a. Moderate MR, Mild-Mod TR, moderate AI and mild-mod PI  2. Hypertension  3. Dyslipidemia  4. Tobacco Habituation   5. Carotid Duplex 4/2/15 - 16-49% narrowing to HAZEL and LICA, antegrade flow   5.1 carotid ultrasound 6/21/17-16-49% bilateral internal carotid arteries.  To moderate carotid bifurcation disease bilaterally with no flow limiting stenosis on either side.  Antegrade flow both vertebral arteries.      5.2 carotid artery ultrasound 5/19/2020-moderate bifurcation disease bilaterally 16 to 49%, relatively discrete 50 to 75% stenosis in the proximal right turn carotid artery with 50% or less or stenosis in the proximal and mid portions of left internal carotid artery, antegrade flow both vertebral arteries       5.3 right carotid  endarterectomy 8/5/2020, Dr. Amaya       5.4 CTA of the carotids 2/8/2021-mild plaque formation at the bulb of the left less than 30%, 30% stenosis in the right subclavian artery and less than 30% in the left, 0.3 cm noncalcified nodule in the left upper lobe       6. PVD       6.1 BLE Arterial US 07/28/2022 -findings compatible with hemodynamic significant peripheral vascular disease at the level of the distal right superficial femoral artery, consider CT angiography       6.2 CTA of abdomen with runoff 8/31/2022-high-grade significant stenosis distal right superficial femoral artery with stenosis estimated 95%, no other areas of high-grade stenosis, prominent diverticulosis left and sigmoid colon with mild inflammatory changes (referred to Dr. Baird)       6.3 RLE Angioplasty 10/27/2022 with Dr. Baird, on Plavix and ASA    Patient is a 77-year-old female who presents today for follow-up on testing.  She denies any chest pain, pressure, dizziness, presyncope, syncope, orthopnea, PND or edema.  She says she has palpitations sometimes she lays on her left side but she says this does not bother her.  She says she only gets short of breath if she is going up a hill or walking long distance and goes up a hill then she might have to stop.  She says this has not changed.  Patient did have angioplasty to her right lower extremity with Dr. Baird and she says she has been doing very good since.    We will over the fact that she had significant plaque in her right lower extremity and she has had abnormal EKG for the past at least 3 EKGs.  She did have a stress test after the first findings of the abnormalities and it was normal.  However I wonder if it is not a false normal.  I recommended patient repeat a stress test because of known significant PVD but this time she feels like she is doing well and does not want to do so.    Current Outpatient Medications on File Prior to Visit   Medication Sig Dispense Refill   •  aspirin 81 MG EC tablet Take 1 tablet by mouth Daily. 90 tablet 3   • Bioflavonoid Products (BRAD C PO) Take 1,000 mg by mouth Daily.     • Calcium Carbonate 1500 (600 Ca) MG tablet Take 1 tablet by mouth Daily.     • diphenhydrAMINE (BENADRYL) 25 mg capsule Take  by mouth At Night As Needed.     • loratadine (Claritin) 10 MG tablet Take 10 mg by mouth Daily.     • MULTIPLE VITAMIN PO Take  by mouth daily.     • NON FORMULARY Hair skin and nails vitamins - 1 tablet daily     • Psyllium (Metamucil) wafer wafer Take  by mouth Daily.     • [DISCONTINUED] cloNIDine (CATAPRES) 0.1 MG tablet Every 8 hours for SBP > 160 or DBP > 90 180 tablet 2   • [DISCONTINUED] clopidogrel (PLAVIX) 75 MG tablet Take 75 mg by mouth Daily.     • [DISCONTINUED] furosemide (LASIX) 20 MG tablet TAKE ONE TABLET BY MOUTH EVERY DAY 90 tablet 3   • [DISCONTINUED] losartan (Cozaar) 50 MG tablet Take 1 tablet by mouth Daily. (Patient taking differently: Take 100 mg by mouth Daily.) 90 tablet 3   • [DISCONTINUED] metoprolol succinate XL (TOPROL-XL) 25 MG 24 hr tablet Take 1 tablet by mouth Daily. 90 tablet 3   • [DISCONTINUED] pravastatin (PRAVACHOL) 40 MG tablet Take 1 tablet by mouth Daily. 90 tablet 3   • [DISCONTINUED] fexofenadine (ALLEGRA) 180 MG tablet Take 180 mg by mouth Daily.       No current facility-administered medications on file prior to visit.       ALLERGIES    Lisinopril and Sulfa antibiotics    Past Medical History:   Diagnosis Date   • Coronary artery disease    • COVID-19 11/01/2022   • COVID-19 vaccine administered 1st -03/03/2021 2nd - 03/31/2021 - Moderna    • Hyperlipidemia    • Hypertension        Social History     Socioeconomic History   • Marital status:    Tobacco Use   • Smoking status: Every Day     Packs/day: 1.00     Years: 39.00     Pack years: 39.00     Types: Cigarettes   • Smokeless tobacco: Never   Vaping Use   • Vaping Use: Never used   Substance and Sexual Activity   • Alcohol use: No     Comment:  A LITTLE WINE   • Drug use: Never   • Sexual activity: Defer       Family History   Problem Relation Age of Onset   • Heart attack Father    • Diabetes Other    • Hyperlipidemia Other    • Hypertension Other    • No Known Problems Mother        Review of Systems   Constitutional: Negative for appetite change, diaphoresis, fatigue and fever.   HENT: Positive for rhinorrhea (right side only ). Negative for congestion and sore throat.    Eyes: Positive for discharge (right eye bhatt) and visual disturbance (glasses ( reading and driving ) ).   Respiratory: Positive for cough (dry cough ) and shortness of breath (going up hills or when she is really busy; long distance/hill has to stop ). Negative for chest tightness and wheezing.    Cardiovascular: Positive for palpitations (when she lays on the left side she can hear her heart rate speeding up; does not bother her ). Negative for chest pain and leg swelling.   Gastrointestinal: Negative for abdominal pain, blood in stool, constipation, diarrhea, nausea and vomiting.   Endocrine: Negative for cold intolerance and heat intolerance.   Genitourinary: Positive for frequency (several times a day and night ). Negative for difficulty urinating, dysuria, hematuria and urgency.   Musculoskeletal: Positive for arthralgias (hands and fingers ). Negative for back pain, joint swelling, neck pain and neck stiffness.   Skin: Negative for color change, pallor, rash and wound.   Allergic/Immunologic: Negative for environmental allergies and food allergies.   Neurological: Negative for dizziness, syncope, weakness, light-headedness, numbness and headaches.   Hematological: Bruises/bleeds easily (Bruises and bleeds easy ).   Psychiatric/Behavioral: Negative for sleep disturbance.       Objective    /74 (BP Location: Left arm, Patient Position: Sitting)   Pulse 87   Temp 97.6 °F (36.4 °C)   Ht 154.9 cm (61\")   Wt 60.3 kg (133 lb)   SpO2 97%   BMI 25.13 kg/m²   Vitals:     01/03/23 1352   BP: 155/74   BP Location: Left arm   Patient Position: Sitting   Pulse: 87   Temp: 97.6 °F (36.4 °C)   SpO2: 97%   Weight: 60.3 kg (133 lb)   Height: 154.9 cm (61\")      Lab Results (most recent)     None        Physical Exam  Vitals reviewed.   Constitutional:       General: She is awake.      Appearance: Normal appearance. She is well-developed, well-groomed and normal weight.   HENT:      Head: Normocephalic.   Eyes:      General: Lids are normal.      Comments: Wears glasses    Neck:      Vascular: No carotid bruit, hepatojugular reflux or JVD.   Cardiovascular:      Rate and Rhythm: Normal rate and regular rhythm.      Pulses:           Radial pulses are 2+ on the right side and 2+ on the left side.        Dorsalis pedis pulses are 2+ on the right side and 2+ on the left side.        Posterior tibial pulses are 2+ on the right side and 2+ on the left side.      Heart sounds: Normal heart sounds.   Pulmonary:      Effort: Pulmonary effort is normal.      Breath sounds: Normal breath sounds and air entry.   Abdominal:      General: Bowel sounds are normal.      Palpations: Abdomen is soft.   Musculoskeletal:      Right lower leg: No edema.      Left lower leg: No edema.   Skin:     General: Skin is warm and dry.   Neurological:      Mental Status: She is alert and oriented to person, place, and time.   Psychiatric:         Attention and Perception: Attention and perception normal.         Mood and Affect: Mood and affect normal.         Speech: Speech normal.         Behavior: Behavior normal. Behavior is cooperative.         Thought Content: Thought content normal.         Cognition and Memory: Cognition and memory normal.         Judgment: Judgment normal.         Procedure      ECG 12 Lead    Date/Time: 1/3/2023 2:12 PM  Performed by: Sonali Hart APRN  Authorized by: Sonali Hart APRN   Comparison: compared with previous ECG from 12/21/2021  Similar to previous ECG  Rhythm: sinus  rhythm  Rate: normal  BPM: 88  Conduction: non-specific intraventricular conduction delay  ST Depression: II, III, aVF, V3, V4, V5 and V6  QRS axis: normal    Clinical impression: abnormal EKG                Assessment & Plan      Diagnosis Plan   1. Primary hypertension  metoprolol succinate XL (TOPROL-XL) 25 MG 24 hr tablet    losartan (Cozaar) 100 MG tablet    cloNIDine (CATAPRES) 0.1 MG tablet    ECG 12 Lead      2. Bilateral carotid artery stenosis  pravastatin (PRAVACHOL) 40 MG tablet      3. Hyperlipidemia, unspecified hyperlipidemia type  pravastatin (PRAVACHOL) 40 MG tablet      4. Cardiomyopathy, dilated (HCC)  metoprolol succinate XL (TOPROL-XL) 25 MG 24 hr tablet      5. Palpitations  ECG 12 Lead      6. PVD (peripheral vascular disease) (HCC)  clopidogrel (PLAVIX) 75 MG tablet      7. Grade I diastolic dysfunction  furosemide (LASIX) 20 MG tablet          Return in about 6 months (around 7/3/2023).    Hypertension-patient is on losartan and metoprolol.  She says that her blood pressure has been fine but she has been running around today and that is why it is elevated.  Carotid artery disease-patient's on aspirin and statin.  Hyperlipidemia-patient is on statin and monitored by PCP.  Cardiomyopathy-patient's on beta-blocker and ARB as well as diuretic.  Palpitations-stable on beta-blocker.  PVD-patient's on aspirin, statin and Plavix.  Diastolic dysfunction-patient is Lasix as needed with no signs of failure.  She will continue her medication regimen.  She will follow-up in 6 months or sooner if any changes.      Maric Stuart  reports that she has been smoking cigarettes. She has a 39.00 pack-year smoking history. She has never used smokeless tobacco.. I have educated her on the risk of diseases from using tobacco products such as cancer, COPD and heart disease.     I advised her to quit and she is not willing to quit.    I spent 3  minutes counseling the patient.         Advance Care Planning   ACP  discussion was declined by the patient. Patient does not have an advance directive, declines further assistance.   Patient brought in medicine list to appointment, it's been reviewed with patient and med list was updated in the chart.         Electronically signed by:

## 2023-08-04 ENCOUNTER — OFFICE VISIT (OUTPATIENT)
Dept: CARDIOLOGY | Facility: CLINIC | Age: 78
End: 2023-08-04
Payer: MEDICARE

## 2023-08-04 VITALS
SYSTOLIC BLOOD PRESSURE: 149 MMHG | BODY MASS INDEX: 26.55 KG/M2 | HEIGHT: 61 IN | WEIGHT: 140.6 LBS | OXYGEN SATURATION: 97 % | DIASTOLIC BLOOD PRESSURE: 69 MMHG | HEART RATE: 80 BPM

## 2023-08-04 DIAGNOSIS — I51.89 GRADE I DIASTOLIC DYSFUNCTION: ICD-10-CM

## 2023-08-04 DIAGNOSIS — F17.200 SMOKING: Primary | ICD-10-CM

## 2023-08-04 DIAGNOSIS — I10 PRIMARY HYPERTENSION: ICD-10-CM

## 2023-08-04 DIAGNOSIS — E78.5 HYPERLIPIDEMIA, UNSPECIFIED HYPERLIPIDEMIA TYPE: ICD-10-CM

## 2023-08-04 DIAGNOSIS — I65.23 BILATERAL CAROTID ARTERY STENOSIS: ICD-10-CM

## 2023-08-04 DIAGNOSIS — I42.0 CARDIOMYOPATHY, DILATED: ICD-10-CM

## 2023-08-04 RX ORDER — LOSARTAN POTASSIUM 100 MG/1
100 TABLET ORAL DAILY
Qty: 90 TABLET | Refills: 3 | Status: SHIPPED | OUTPATIENT
Start: 2023-08-04 | End: 2023-08-04 | Stop reason: SDUPTHER

## 2023-08-04 RX ORDER — METOPROLOL SUCCINATE 25 MG/1
25 TABLET, EXTENDED RELEASE ORAL DAILY
Qty: 90 TABLET | Refills: 3 | Status: SHIPPED | OUTPATIENT
Start: 2023-08-04

## 2023-08-04 RX ORDER — NICOTINE 21 MG/24HR
1 PATCH, TRANSDERMAL 24 HOURS TRANSDERMAL EVERY 24 HOURS
Qty: 30 PATCH | Refills: 1 | Status: SHIPPED | OUTPATIENT
Start: 2023-08-04

## 2023-08-04 RX ORDER — FUROSEMIDE 20 MG/1
20 TABLET ORAL DAILY
Qty: 90 TABLET | Refills: 3 | Status: SHIPPED | OUTPATIENT
Start: 2023-08-04

## 2023-08-04 RX ORDER — LOSARTAN POTASSIUM AND HYDROCHLOROTHIAZIDE 25; 100 MG/1; MG/1
1 TABLET ORAL DAILY
Qty: 90 TABLET | Refills: 1 | Status: SHIPPED | OUTPATIENT
Start: 2023-08-04

## 2023-08-04 RX ORDER — PRAVASTATIN SODIUM 40 MG
40 TABLET ORAL DAILY
Qty: 90 TABLET | Refills: 3 | Status: SHIPPED | OUTPATIENT
Start: 2023-08-04

## 2023-11-06 ENCOUNTER — OFFICE VISIT (OUTPATIENT)
Dept: CARDIOLOGY | Facility: CLINIC | Age: 78
End: 2023-11-06
Payer: MEDICARE

## 2023-11-06 VITALS
SYSTOLIC BLOOD PRESSURE: 126 MMHG | WEIGHT: 143 LBS | DIASTOLIC BLOOD PRESSURE: 67 MMHG | BODY MASS INDEX: 27 KG/M2 | HEART RATE: 90 BPM | OXYGEN SATURATION: 97 % | HEIGHT: 61 IN

## 2023-11-06 DIAGNOSIS — I10 PRIMARY HYPERTENSION: Primary | ICD-10-CM

## 2023-11-06 DIAGNOSIS — I51.89 GRADE I DIASTOLIC DYSFUNCTION: ICD-10-CM

## 2023-11-06 DIAGNOSIS — E78.5 HYPERLIPIDEMIA, UNSPECIFIED HYPERLIPIDEMIA TYPE: ICD-10-CM

## 2023-11-06 DIAGNOSIS — I65.23 BILATERAL CAROTID ARTERY STENOSIS: ICD-10-CM

## 2023-11-06 DIAGNOSIS — E78.2 MIXED HYPERLIPIDEMIA: ICD-10-CM

## 2023-11-06 DIAGNOSIS — Z87.891 FORMER SMOKER: ICD-10-CM

## 2023-11-06 DIAGNOSIS — I42.0 CARDIOMYOPATHY, DILATED: ICD-10-CM

## 2023-11-06 DIAGNOSIS — R06.02 SOB (SHORTNESS OF BREATH) ON EXERTION: ICD-10-CM

## 2023-11-06 PROBLEM — IMO0001 SMOKING: Status: RESOLVED | Noted: 2017-05-24 | Resolved: 2023-11-06

## 2023-11-06 PROBLEM — F17.200 SMOKING: Status: RESOLVED | Noted: 2017-05-24 | Resolved: 2023-11-06

## 2023-11-06 PROCEDURE — 3078F DIAST BP <80 MM HG: CPT | Performed by: SPECIALIST

## 2023-11-06 PROCEDURE — 99214 OFFICE O/P EST MOD 30 MIN: CPT | Performed by: SPECIALIST

## 2023-11-06 PROCEDURE — 3074F SYST BP LT 130 MM HG: CPT | Performed by: SPECIALIST

## 2023-11-06 RX ORDER — FUROSEMIDE 20 MG/1
20 TABLET ORAL DAILY
Qty: 90 TABLET | Refills: 3 | Status: SHIPPED | OUTPATIENT
Start: 2023-11-06

## 2023-11-06 RX ORDER — CLONIDINE HYDROCHLORIDE 0.1 MG/1
0.1 TABLET ORAL 3 TIMES DAILY PRN
COMMUNITY
End: 2023-11-06

## 2023-11-06 RX ORDER — LANOLIN ALCOHOL/MO/W.PET/CERES
1000 CREAM (GRAM) TOPICAL DAILY
COMMUNITY

## 2023-11-06 RX ORDER — PRAVASTATIN SODIUM 40 MG
40 TABLET ORAL DAILY
Qty: 90 TABLET | Refills: 3 | Status: SHIPPED | OUTPATIENT
Start: 2023-11-06

## 2023-11-06 NOTE — PROGRESS NOTES
Subjective   Follow up, HTN  Marci Stuart is a 78 y.o. female who presents to day for Follow-up (Here for 3 mo. F/u), Carotid Artery Disease, Congestive Heart Failure, Cardiomyopathy, Hyperlipidemia, and Hypertension.    CHIEF COMPLIANT  Chief Complaint   Patient presents with    Follow-up     Here for 3 mo. F/u    Carotid Artery Disease    Congestive Heart Failure    Cardiomyopathy    Hyperlipidemia    Hypertension     Problem List:     1. Dilated cardiomyopathy, EF initially felt to be 30 to 35%.      1.1 echo 5/19/2020-EF 56 to 60%, mild LVH, diastolic dysfunction 1, trivial to mild MR, trivial TR  2.stress test 1/8/2021-no evidence of ischemia, post-rest EF 58%      3. Hypertension  4. Dyslipidemia  5. WILLI / PAD      5.1 carotid artery ultrasound 5/19/2020-moderate bifurcation disease bilaterally 16 to 49%, relatively discrete 50 to 75% stenosis in the proximal right turn carotid artery with 50% or less or stenosis in the proximal and mid portions of left internal carotid artery, antegrade flow both vertebral arteries       5.2 right carotid endarterectomy 8/5/2020, Dr. Amaya       5.3 CTA of the carotids 2/8/2021-mild plaque formation at the bulb of the left less than 30%, 30% stenosis in the right subclavian artery and less than 30% in the left, 0.3 cm noncalcified nodule in the left upper lobe       6. PVD        6.1 CTA of abdomen with runoff 8/31/2022-high-grade significant stenosis distal right superficial femoral artery with stenosis estimated 95%, no other areas of high-grade stenosis, prominent diverticulosis left and sigmoid colon with mild inflammatory changes (referred to Dr. Baird)       6.3 RLE Angioplasty 10/27/2022 with Dr. Baird, on Plavix and ASA      Problem List Items Addressed This Visit          Cardiac and Vasculature    Cardiomyopathy, dilated    Hyperlipidemia    Relevant Medications    pravastatin (PRAVACHOL) 40 MG tablet    Other Relevant Orders    Lipid Panel    Comprehensive  Metabolic Panel    BP (high blood pressure) - Primary    Relevant Medications    losartan-HCTZ (HYZAAR) 50-12.5 combo dose    furosemide (LASIX) 20 MG tablet    SOB (shortness of breath) on exertion    Bilateral carotid artery stenosis    Relevant Medications    pravastatin (PRAVACHOL) 40 MG tablet       Tobacco    Former smoker     Other Visit Diagnoses       Grade I diastolic dysfunction        Relevant Medications    furosemide (LASIX) 20 MG tablet            HPI  She has been doing relatively well but sometimes her blood pressure goes down to about 110 over 53 years ago and with this she feels a little bit sluggish and tired from the cardiac standpoint no recent chest pain no significant shortness of breath or edema no palpitations she did quit smoking                      PRIOR MEDS  Current Outpatient Medications on File Prior to Visit   Medication Sig Dispense Refill    aspirin 81 MG EC tablet Take 1 tablet by mouth Daily. 90 tablet 3    Bioflavonoid Products (BRAD C PO) Take 1,000 mg by mouth Daily.      Calcium Carbonate 1500 (600 Ca) MG tablet Take 1 tablet by mouth Daily.      Fexofenadine HCl (ALLEGRA ALLERGY PO) Take  by mouth.      metoprolol succinate XL (TOPROL-XL) 25 MG 24 hr tablet Take 1 tablet by mouth Daily. 90 tablet 3    MULTIPLE VITAMIN PO Take  by mouth daily.      nicotine (NICODERM CQ) 7 MG/24HR patch Place 1 patch on the skin as directed by provider Daily.      NON FORMULARY Hair skin and nails vitamins - 1 tablet daily      Psyllium (Metamucil) wafer wafer Take  by mouth Daily.      vitamin B-12 (CYANOCOBALAMIN) 1000 MCG tablet Take 1 tablet by mouth Daily.      vitamin D3 125 MCG (5000 UT) capsule capsule Take 1 capsule by mouth Daily.      [DISCONTINUED] cloNIDine (CATAPRES) 0.1 MG tablet Take 1 tablet by mouth 3 (Three) Times a Day As Needed for High Blood Pressure. B/p > 160/90      [DISCONTINUED] furosemide (LASIX) 20 MG tablet Take 1 tablet by mouth Daily. 90 tablet 3     [DISCONTINUED] losartan-hydrochlorothiazide (Hyzaar) 100-25 MG per tablet Take 1 tablet by mouth Daily. 90 tablet 1    [DISCONTINUED] pravastatin (PRAVACHOL) 40 MG tablet Take 1 tablet by mouth Daily. 90 tablet 3    [DISCONTINUED] nicotine (Nicoderm CQ) 21 MG/24HR patch Place 1 patch on the skin as directed by provider Daily. 30 patch 1     No current facility-administered medications on file prior to visit.       ALLERGIES  Cetirizine, Lisinopril, and Sulfa antibiotics    HISTORY  Past Medical History:   Diagnosis Date    Coronary artery disease     COVID-19 11/01/2022    COVID-19 vaccine administered 1st -03/03/2021 2nd - 03/31/2021 - Moderna     Hyperlipidemia     Hypertension        Social History     Socioeconomic History    Marital status:    Tobacco Use    Smoking status: Former     Packs/day: 1.00     Years: 39.00     Additional pack years: 0.00     Total pack years: 39.00     Types: Cigarettes    Smokeless tobacco: Never   Vaping Use    Vaping Use: Never used   Substance and Sexual Activity    Alcohol use: No     Comment: A LITTLE WINE    Drug use: Never    Sexual activity: Defer       Family History   Problem Relation Age of Onset    Heart attack Father     Diabetes Other     Hyperlipidemia Other     Hypertension Other     No Known Problems Mother        Review of Systems   Constitutional: Negative.    HENT: Negative.     Eyes:  Positive for visual disturbance (glasses).   Respiratory: Negative.     Cardiovascular:  Positive for palpitations. Negative for chest pain and leg swelling.   Gastrointestinal: Negative.    Endocrine: Negative.    Genitourinary: Negative.    Musculoskeletal:  Positive for arthralgias, back pain and myalgias.   Skin: Negative.    Allergic/Immunologic: Negative.    Neurological: Negative.    Hematological:  Bruises/bleeds easily.   Psychiatric/Behavioral:  Positive for sleep disturbance. The patient is nervous/anxious (r/t trying to stop smoking).        Objective  "    VITALS: /67 (BP Location: Left arm, Patient Position: Sitting)   Pulse 90   Ht 154.9 cm (60.98\")   Wt 64.9 kg (143 lb)   SpO2 97%   BMI 27.03 kg/m²     LABS:   Lab Results (most recent)       None            IMAGING:   No Images in the past 120 days found..    EXAM:  Physical Exam  Vitals reviewed.   Constitutional:       Appearance: She is well-developed.   HENT:      Head: Normocephalic and atraumatic.   Eyes:      Pupils: Pupils are equal, round, and reactive to light.   Neck:      Thyroid: No thyromegaly.      Vascular: No JVD.   Cardiovascular:      Rate and Rhythm: Normal rate and regular rhythm.      Heart sounds: Normal heart sounds. No murmur heard.     No friction rub. No gallop.   Pulmonary:      Effort: Pulmonary effort is normal. No respiratory distress.      Breath sounds: Normal breath sounds. No stridor. No wheezing or rales.   Chest:      Chest wall: No tenderness.   Musculoskeletal:         General: No tenderness or deformity.      Cervical back: Neck supple.   Skin:     General: Skin is warm and dry.   Neurological:      Mental Status: She is alert and oriented to person, place, and time.      Cranial Nerves: No cranial nerve deficit.      Coordination: Coordination normal.         Procedure   Procedures       Assessment & Plan     Diagnoses and all orders for this visit:    1. Primary hypertension (Primary)    2. Bilateral carotid artery stenosis  -     pravastatin (PRAVACHOL) 40 MG tablet; Take 1 tablet by mouth Daily.  Dispense: 90 tablet; Refill: 3    3. Cardiomyopathy, dilated    4. Mixed hyperlipidemia  -     Lipid Panel; Future  -     Comprehensive Metabolic Panel; Future    5. SOB (shortness of breath) on exertion    6. Former smoker    7. Grade I diastolic dysfunction  -     furosemide (LASIX) 20 MG tablet; Take 1 tablet by mouth Daily.  Dispense: 90 tablet; Refill: 3    8. Hyperlipidemia, unspecified hyperlipidemia type  -     pravastatin (PRAVACHOL) 40 MG tablet; Take 1 " tablet by mouth Daily.  Dispense: 90 tablet; Refill: 3    Other orders  -     losartan-HCTZ (HYZAAR) 50-12.5 combo dose; Take 100 doses by mouth Daily.  Dispense: 90 tablet; Refill: 1    1.  Her blood pressure is little bit on the lower side some going to cut down the losartan to 100/12.5 mg/day as it goes down to about 110 systolic  2.  I do not have any recent blood work I will try to get labs prior to her next visit  3.  Again she had recent carotid endarterectomy she is doing fine we will continue current management  4.  Regarding her cardiomyopathy she has recovered systolic function and the rest echocardiogram will continue with the current management  5.  The great news is she quit the smoking now    Return in about 6 months (around 5/6/2024).      Advance Care Planning   ACP discussion was held with the patient during this visit. Info. Previously provided             MEDS ORDERED DURING VISIT:  New Medications Ordered This Visit   Medications    losartan-HCTZ (HYZAAR) 50-12.5 combo dose     Sig: Take 100 doses by mouth Daily.     Dispense:  90 tablet     Refill:  1    furosemide (LASIX) 20 MG tablet     Sig: Take 1 tablet by mouth Daily.     Dispense:  90 tablet     Refill:  3     This prescription was filled on 1/24/2022. Any refills authorized will be placed on file.    pravastatin (PRAVACHOL) 40 MG tablet     Sig: Take 1 tablet by mouth Daily.     Dispense:  90 tablet     Refill:  3       As always, Coco Kasper, DO  I appreciate very much the opportunity to participate in the cardiovascular care of your patients. Please do not hesitate to call me with any questions with regards to Marci Stuart evaluation and management.         This document has been electronically signed by Darnell Ambrosio MD  November 6, 2023 15:23 EST    This note is dictated utilizing voice recognition software.

## 2023-11-07 NOTE — TELEPHONE ENCOUNTER
I could not get this to go through e scripts so I called and gave pharmacist the changes to Losartan -HCTZ 50-12.5 po qd.

## 2024-05-01 ENCOUNTER — OFFICE VISIT (OUTPATIENT)
Dept: CARDIOLOGY | Facility: CLINIC | Age: 79
End: 2024-05-01
Payer: MEDICARE

## 2024-05-01 VITALS
BODY MASS INDEX: 27.08 KG/M2 | WEIGHT: 143.4 LBS | OXYGEN SATURATION: 95 % | DIASTOLIC BLOOD PRESSURE: 68 MMHG | HEIGHT: 61 IN | SYSTOLIC BLOOD PRESSURE: 150 MMHG | HEART RATE: 100 BPM

## 2024-05-01 DIAGNOSIS — R06.02 SOB (SHORTNESS OF BREATH) ON EXERTION: ICD-10-CM

## 2024-05-01 DIAGNOSIS — I42.0 CARDIOMYOPATHY, DILATED: ICD-10-CM

## 2024-05-01 DIAGNOSIS — I10 PRIMARY HYPERTENSION: Primary | ICD-10-CM

## 2024-05-01 DIAGNOSIS — I51.89 GRADE I DIASTOLIC DYSFUNCTION: ICD-10-CM

## 2024-05-01 DIAGNOSIS — E78.5 HYPERLIPIDEMIA, UNSPECIFIED HYPERLIPIDEMIA TYPE: ICD-10-CM

## 2024-05-01 DIAGNOSIS — I65.23 BILATERAL CAROTID ARTERY STENOSIS: ICD-10-CM

## 2024-05-01 DIAGNOSIS — F17.210 CIGARETTE SMOKER: ICD-10-CM

## 2024-05-01 DIAGNOSIS — R07.89 CHEST TIGHTNESS: ICD-10-CM

## 2024-05-01 DIAGNOSIS — E78.2 MIXED HYPERLIPIDEMIA: ICD-10-CM

## 2024-05-01 PROBLEM — Z87.891 FORMER SMOKER: Status: RESOLVED | Noted: 2023-11-06 | Resolved: 2024-05-01

## 2024-05-01 RX ORDER — LOSARTAN POTASSIUM AND HYDROCHLOROTHIAZIDE 12.5; 5 MG/1; MG/1
1 TABLET ORAL DAILY
Qty: 90 TABLET | Refills: 1 | Status: SHIPPED | OUTPATIENT
Start: 2024-05-01

## 2024-05-01 RX ORDER — LOSARTAN POTASSIUM AND HYDROCHLOROTHIAZIDE 12.5; 5 MG/1; MG/1
1 TABLET ORAL DAILY
COMMUNITY
Start: 2024-04-24 | End: 2024-05-01 | Stop reason: SDUPTHER

## 2024-05-01 RX ORDER — METOPROLOL SUCCINATE 25 MG/1
25 TABLET, EXTENDED RELEASE ORAL DAILY
Qty: 90 TABLET | Refills: 3 | Status: SHIPPED | OUTPATIENT
Start: 2024-05-01

## 2024-05-01 RX ORDER — PRAVASTATIN SODIUM 80 MG/1
80 TABLET ORAL DAILY
Qty: 90 TABLET | Refills: 1 | Status: SHIPPED | OUTPATIENT
Start: 2024-05-01

## 2024-05-01 RX ORDER — CLONIDINE HYDROCHLORIDE 0.1 MG/1
0.1 TABLET ORAL
COMMUNITY
End: 2024-05-01 | Stop reason: ALTCHOICE

## 2024-05-01 RX ORDER — FUROSEMIDE 20 MG/1
20 TABLET ORAL DAILY
Qty: 90 TABLET | Refills: 3 | Status: SHIPPED | OUTPATIENT
Start: 2024-05-01

## 2024-05-01 RX ORDER — PRAVASTATIN SODIUM 80 MG/1
80 TABLET ORAL DAILY
Qty: 90 TABLET | Refills: 1 | Status: SHIPPED | OUTPATIENT
Start: 2024-05-01 | End: 2024-05-01 | Stop reason: SDUPTHER

## 2024-05-01 NOTE — PROGRESS NOTES
Subjective   Follow up, HTN, CMP  Marci Stuart is a 78 y.o. female who presents to day for Follow-up (Here for 6 mo. F/u), Cardiomyopathy, Hyperlipidemia, Hypertension, Shortness of Breath, and Carotid Artery Disease.    CHIEF COMPLIANT  Chief Complaint   Patient presents with    Follow-up     Here for 6 mo. F/u    Cardiomyopathy    Hyperlipidemia    Hypertension    Shortness of Breath    Carotid Artery Disease     Problem List:     1. Dilated cardiomyopathy, EF initially felt to be 30 to 35%.      1.1 echo 5/19/2020-EF 56 to 60%, mild LVH, diastolic dysfunction 1, trivial to mild MR, trivial TR  2.stress test 1/8/2021-no evidence of ischemia, post-rest EF 58%      3. Hypertension  4. Dyslipidemia  5. WILLI / PAD      5.1 carotid artery ultrasound 5/19/2020-moderate bifurcation disease bilaterally 16 to 49%, relatively discrete 50 to 75% stenosis in the proximal right turn carotid artery with 50% or less or stenosis in the proximal and mid portions of left internal carotid artery, antegrade flow both vertebral arteries       5.2 right carotid endarterectomy 8/5/2020, Dr. Amaya       5.3 CTA of the carotids 2/8/2021-mild plaque formation at the bulb of the left less than 30%, 30% stenosis in the right subclavian artery and less than 30% in the left, 0.3 cm noncalcified nodule in the left upper lobe       6. PVD        6.1 CTA of abdomen with runoff 8/31/2022-high-grade significant stenosis distal right superficial femoral artery with stenosis estimated 95%, no other areas of high-grade stenosis, prominent diverticulosis left and sigmoid colon with mild inflammatory changes (referred to Dr. Baird)       6.3 RLE Angioplasty 10/27/2022 with Dr. Baird, on Plavix and ASA  Problem List Items Addressed This Visit          Cardiac and Vasculature    Cardiomyopathy, dilated    Relevant Medications    metoprolol succinate XL (TOPROL-XL) 25 MG 24 hr tablet    Hyperlipidemia    Relevant Medications    pravastatin  (PRAVACHOL) 80 MG tablet    Other Relevant Orders    Lipid Panel    Comprehensive Metabolic Panel    BP (high blood pressure) - Primary    Relevant Medications    furosemide (LASIX) 20 MG tablet    losartan-hydrochlorothiazide (HYZAAR) 50-12.5 MG per tablet    metoprolol succinate XL (TOPROL-XL) 25 MG 24 hr tablet    SOB (shortness of breath) on exertion    Bilateral carotid artery stenosis    Relevant Medications    pravastatin (PRAVACHOL) 80 MG tablet    Other Relevant Orders    Duplex Carotid Ultrasound CAR    Chest tightness       Tobacco    Cigarette smoker     Other Visit Diagnoses       Grade I diastolic dysfunction        Relevant Medications    furosemide (LASIX) 20 MG tablet            HPI  She has been doing well she is had angioplasty by Dr. Baird on the right side and since she has no claudication she is going to see him this afternoon from the cardiac standpoint she has stable dyspnea on exertion NYHA class II with no recent chest pain no palpitations and no edema unfortunately she continues to smoke                    PRIOR MEDS  Current Outpatient Medications on File Prior to Visit   Medication Sig Dispense Refill    aspirin 81 MG EC tablet Take 1 tablet by mouth Daily. 90 tablet 3    Bioflavonoid Products (BRAD C PO) Take 1,000 mg by mouth Daily.      Calcium Carbonate 1500 (600 Ca) MG tablet Take 1 tablet by mouth Daily.      Fexofenadine HCl (ALLEGRA ALLERGY PO) Take  by mouth Daily.      MULTIPLE VITAMIN PO Take  by mouth daily.      NON FORMULARY Hair skin and nails vitamins - 1 tablet daily      Psyllium (Metamucil) wafer wafer Take  by mouth Daily.      vitamin B-12 (CYANOCOBALAMIN) 1000 MCG tablet Take 1 tablet by mouth Daily.      vitamin D3 125 MCG (5000 UT) capsule capsule Take 1 capsule by mouth Daily.      [DISCONTINUED] furosemide (LASIX) 20 MG tablet Take 1 tablet by mouth Daily. 90 tablet 3    [DISCONTINUED] losartan-hydrochlorothiazide (HYZAAR) 50-12.5 MG per tablet Take 1 tablet by  mouth Daily.      [DISCONTINUED] metoprolol succinate XL (TOPROL-XL) 25 MG 24 hr tablet Take 1 tablet by mouth Daily. 90 tablet 3    [DISCONTINUED] pravastatin (PRAVACHOL) 40 MG tablet Take 1 tablet by mouth Daily. 90 tablet 3    [DISCONTINUED] cloNIDine (CATAPRES) 0.1 MG tablet Take 1 tablet by mouth. Take prn for b/p > 160/90 (Patient not taking: Reported on 5/1/2024)      [DISCONTINUED] nicotine (NICODERM CQ) 7 MG/24HR patch Place 1 patch on the skin as directed by provider Daily.       No current facility-administered medications on file prior to visit.       ALLERGIES  Cetirizine, Lisinopril, and Sulfa antibiotics    HISTORY  Past Medical History:   Diagnosis Date    Coronary artery disease     COVID-19 11/01/2022    COVID-19 vaccine administered 1st -03/03/2021 2nd - 03/31/2021 - Moderna     Hyperlipidemia     Hypertension        Social History     Socioeconomic History    Marital status:    Tobacco Use    Smoking status: Former     Current packs/day: 1.00     Average packs/day: 1 pack/day for 39.0 years (39.0 ttl pk-yrs)     Types: Cigarettes    Smokeless tobacco: Never   Vaping Use    Vaping status: Never Used   Substance and Sexual Activity    Alcohol use: No     Comment: A LITTLE WINE    Drug use: Never    Sexual activity: Defer       Family History   Problem Relation Age of Onset    Heart attack Father     Diabetes Other     Hyperlipidemia Other     Hypertension Other     No Known Problems Mother        Review of Systems   Constitutional: Negative.    HENT: Negative.     Eyes:  Positive for visual disturbance (glasses).   Respiratory: Negative.     Cardiovascular:  Positive for leg swelling (occas. in heat). Negative for chest pain and palpitations.   Gastrointestinal: Negative.    Endocrine: Negative.    Genitourinary: Negative.    Musculoskeletal:  Positive for arthralgias and myalgias.   Skin: Negative.    Allergic/Immunologic: Positive for environmental allergies.   Neurological: Negative.   "  Hematological:  Bruises/bleeds easily.   Psychiatric/Behavioral: Negative.         Objective     VITALS: /68 (BP Location: Left arm, Patient Position: Sitting)   Pulse 100   Ht 154.9 cm (60.98\")   Wt 65 kg (143 lb 6.4 oz)   SpO2 95%   BMI 27.11 kg/m²     LABS:   Lab Results (most recent)       None            IMAGING:   No Images in the past 120 days found..    EXAM:  Physical Exam  Vitals reviewed.   Constitutional:       Appearance: She is well-developed.   HENT:      Head: Normocephalic and atraumatic.   Eyes:      Pupils: Pupils are equal, round, and reactive to light.   Neck:      Thyroid: No thyromegaly.      Vascular: Carotid bruit present. No JVD.      Comments: Left carotid bruit  Cardiovascular:      Rate and Rhythm: Normal rate and regular rhythm.      Heart sounds: Normal heart sounds. No murmur heard.     No friction rub. No gallop.   Pulmonary:      Effort: Pulmonary effort is normal. No respiratory distress.      Breath sounds: Normal breath sounds. No stridor. No wheezing or rales.   Chest:      Chest wall: No tenderness.   Musculoskeletal:         General: No tenderness or deformity.      Cervical back: Neck supple.   Skin:     General: Skin is warm and dry.   Neurological:      Mental Status: She is alert and oriented to person, place, and time.      Cranial Nerves: No cranial nerve deficit.      Coordination: Coordination normal.         Procedure   Procedures        Assessment & Plan     Diagnoses and all orders for this visit:    1. Primary hypertension (Primary)  -     losartan-hydrochlorothiazide (HYZAAR) 50-12.5 MG per tablet; Take 1 tablet by mouth Daily.  Dispense: 90 tablet; Refill: 1  -     metoprolol succinate XL (TOPROL-XL) 25 MG 24 hr tablet; Take 1 tablet by mouth Daily.  Dispense: 90 tablet; Refill: 3    2. Bilateral carotid artery stenosis  -     Duplex Carotid Ultrasound CAR; Future  -     Discontinue: pravastatin (PRAVACHOL) 80 MG tablet; Take 1 tablet by mouth Daily. "  Dispense: 90 tablet; Refill: 1  -     pravastatin (PRAVACHOL) 80 MG tablet; Take 1 tablet by mouth Daily.  Dispense: 90 tablet; Refill: 1    3. Cardiomyopathy, dilated  -     metoprolol succinate XL (TOPROL-XL) 25 MG 24 hr tablet; Take 1 tablet by mouth Daily.  Dispense: 90 tablet; Refill: 3    4. Chest tightness    5. Mixed hyperlipidemia  -     Lipid Panel; Future  -     Comprehensive Metabolic Panel; Future    6. SOB (shortness of breath) on exertion    7. Cigarette smoker    8. Hyperlipidemia, unspecified hyperlipidemia type  -     Discontinue: pravastatin (PRAVACHOL) 80 MG tablet; Take 1 tablet by mouth Daily.  Dispense: 90 tablet; Refill: 1  -     pravastatin (PRAVACHOL) 80 MG tablet; Take 1 tablet by mouth Daily.  Dispense: 90 tablet; Refill: 1    9. Grade I diastolic dysfunction  -     furosemide (LASIX) 20 MG tablet; Take 1 tablet by mouth Daily.  Dispense: 90 tablet; Refill: 3    1.  Her blood pressure is elevated today she says she has stressful day today I reviewed her blood pressure measurements at home she is well-controlled so we will continue with the current management  2.  Again with the last echocardiogram showed recovery of her systolic function  3.  I hear left carotid bruit I am going to get carotid Doppler for assessment  4.  I reviewed her labs from November I do not have any more recent labs with suboptimal LDL of 85 and low HDL of 38 some going to increase the dose of the pravastatin to 80 mg/day and I will repeat labs prior to her next visit  5.  She has no claudication since she had PCI by Dr. Baird she is going to see him this afternoon  6.  Again we talked about quitting smoking the stress about this she is aware of that she is using nicotine patch    Return in about 6 months (around 11/1/2024).      Advance Care Planning   ACP discussion was held with the patient during this visit. Info. Previously provided             MEDS ORDERED DURING VISIT:  New Medications Ordered This Visit    Medications    furosemide (LASIX) 20 MG tablet     Sig: Take 1 tablet by mouth Daily.     Dispense:  90 tablet     Refill:  3     This prescription was filled on 1/24/2022. Any refills authorized will be placed on file.    losartan-hydrochlorothiazide (HYZAAR) 50-12.5 MG per tablet     Sig: Take 1 tablet by mouth Daily.     Dispense:  90 tablet     Refill:  1    metoprolol succinate XL (TOPROL-XL) 25 MG 24 hr tablet     Sig: Take 1 tablet by mouth Daily.     Dispense:  90 tablet     Refill:  3    pravastatin (PRAVACHOL) 80 MG tablet     Sig: Take 1 tablet by mouth Daily.     Dispense:  90 tablet     Refill:  1       As always, Coco Kasper, DO  I appreciate very much the opportunity to participate in the cardiovascular care of your patients. Please do not hesitate to call me with any questions with regards to Marci Stuart evaluation and management.         This document has been electronically signed by Darnell Ambrosio MD  May 1, 2024 14:51 EDT    This note is dictated utilizing voice recognition software.

## 2024-05-07 ENCOUNTER — TELEPHONE (OUTPATIENT)
Dept: CARDIOLOGY | Facility: CLINIC | Age: 79
End: 2024-05-07

## 2024-05-07 DIAGNOSIS — I65.23 BILATERAL CAROTID ARTERY STENOSIS: Primary | ICD-10-CM

## 2024-05-07 NOTE — TELEPHONE ENCOUNTER
Caller: Marci Stuart    Relationship: Self    Best call back number: [phone]    What orders are you requesting (i.e. lab or imaging): DUPLEX CAROTID ULTRA SOUND.    In what timeframe would the patient need to come in:     Where will you receive your lab/imaging services: [facility]    Additional notes: PATIENT IS WANTING TO KNOW IF SHE CAN HAVE THE DUPLEX CAROTID ULTRASOUND DONE AT [facility], WHICH IS CLOSER FOR HER. PLEASE REACH OUT.

## 2024-05-14 ENCOUNTER — TELEPHONE (OUTPATIENT)
Dept: CARDIOLOGY | Facility: CLINIC | Age: 79
End: 2024-05-14
Payer: MEDICARE

## 2024-05-14 DIAGNOSIS — R93.89 ABNORMAL CAROTID ULTRASOUND: Primary | ICD-10-CM

## 2024-05-14 DIAGNOSIS — I65.23 BILATERAL CAROTID ARTERY STENOSIS: ICD-10-CM

## 2024-05-14 DIAGNOSIS — I73.9 PAD (PERIPHERAL ARTERY DISEASE): ICD-10-CM

## 2024-05-14 NOTE — TELEPHONE ENCOUNTER
PATIENT NOTIFIED OF CAROTID U/S RESULTS AND AGREEABLE TO CTA CAROTIDS / BMP PRIOR IN CASE IMAGING CENTER REQUIRES. DENIES CONTRAST DYE OR SHELLFISH ALLERGY. SHE REQUESTS THAT CTA BE SCHEDULED AT IM[facility]ING CENTER THERE IN [facility]. STATES SHE WAS TOLD THEY COULD PERFORM CTA THERE. ORDER PLACED. AWARE SOMEONE WILL CALL HER WITH APPT. DATE AND TIME. DEANNE WINSTON

## 2024-05-15 ENCOUNTER — LAB (OUTPATIENT)
Dept: LAB | Facility: HOSPITAL | Age: 79
End: 2024-05-15
Payer: MEDICARE

## 2024-05-15 DIAGNOSIS — I73.9 PAD (PERIPHERAL ARTERY DISEASE): ICD-10-CM

## 2024-05-15 DIAGNOSIS — I65.23 BILATERAL CAROTID ARTERY STENOSIS: ICD-10-CM

## 2024-05-15 DIAGNOSIS — E78.2 MIXED HYPERLIPIDEMIA: ICD-10-CM

## 2024-05-15 DIAGNOSIS — R93.89 ABNORMAL CAROTID ULTRASOUND: ICD-10-CM

## 2024-05-15 LAB
ALBUMIN SERPL-MCNC: 4.3 G/DL (ref 3.5–5.2)
ALBUMIN/GLOB SERPL: 1.2 G/DL
ALP SERPL-CCNC: 108 U/L (ref 39–117)
ALT SERPL W P-5'-P-CCNC: 12 U/L (ref 1–33)
ANION GAP SERPL CALCULATED.3IONS-SCNC: 12.6 MMOL/L (ref 5–15)
AST SERPL-CCNC: 20 U/L (ref 1–32)
BILIRUB SERPL-MCNC: 0.4 MG/DL (ref 0–1.2)
BUN SERPL-MCNC: 13 MG/DL (ref 8–23)
BUN/CREAT SERPL: 18.6 (ref 7–25)
CALCIUM SPEC-SCNC: 10.5 MG/DL (ref 8.6–10.5)
CHLORIDE SERPL-SCNC: 100 MMOL/L (ref 98–107)
CHOLEST SERPL-MCNC: 134 MG/DL (ref 0–200)
CO2 SERPL-SCNC: 27.4 MMOL/L (ref 22–29)
CREAT SERPL-MCNC: 0.7 MG/DL (ref 0.57–1)
EGFRCR SERPLBLD CKD-EPI 2021: 88.7 ML/MIN/1.73
GLOBULIN UR ELPH-MCNC: 3.6 GM/DL
GLUCOSE SERPL-MCNC: 96 MG/DL (ref 65–99)
HDLC SERPL-MCNC: 32 MG/DL (ref 40–60)
LDLC SERPL CALC-MCNC: 59 MG/DL (ref 0–100)
LDLC/HDLC SERPL: 1.5 {RATIO}
POTASSIUM SERPL-SCNC: 4.2 MMOL/L (ref 3.5–5.2)
PROT SERPL-MCNC: 7.9 G/DL (ref 6–8.5)
SODIUM SERPL-SCNC: 140 MMOL/L (ref 136–145)
TRIGL SERPL-MCNC: 270 MG/DL (ref 0–150)
VLDLC SERPL-MCNC: 43 MG/DL (ref 5–40)

## 2024-05-15 PROCEDURE — 80061 LIPID PANEL: CPT | Performed by: SPECIALIST

## 2024-05-15 PROCEDURE — 80053 COMPREHEN METABOLIC PANEL: CPT | Performed by: SPECIALIST

## 2024-05-20 ENCOUNTER — TELEPHONE (OUTPATIENT)
Dept: CARDIOLOGY | Facility: CLINIC | Age: 79
End: 2024-05-20
Payer: MEDICARE

## 2024-05-20 NOTE — TELEPHONE ENCOUNTER
Caller: [facility]    Relationship: Provider    Best call back number: [phone]     What orders are you requesting (i.e. lab or imaging): CMP LABS     In what timeframe would the patient need to come in: COMING IN TOMORROW APPROX 1:00PM     Where will you receive your lab/imaging services:  [facility] @ [facility]   [phone]

## 2024-06-11 ENCOUNTER — TELEPHONE (OUTPATIENT)
Dept: CARDIOLOGY | Facility: CLINIC | Age: 79
End: 2024-06-11
Payer: MEDICARE

## 2024-06-11 NOTE — TELEPHONE ENCOUNTER
Patient called office requesting result's of CTA carotids, she also requested that copy of this be sent to Dr. Kevon Baird ( vascular ).    Copy of report sent to Dr. Crawford office (vascular).

## 2024-10-25 DIAGNOSIS — E78.5 HYPERLIPIDEMIA, UNSPECIFIED HYPERLIPIDEMIA TYPE: ICD-10-CM

## 2024-10-25 DIAGNOSIS — I65.23 BILATERAL CAROTID ARTERY STENOSIS: ICD-10-CM

## 2024-10-25 RX ORDER — PRAVASTATIN SODIUM 80 MG/1
80 TABLET ORAL DAILY
Qty: 90 TABLET | Refills: 3 | Status: SHIPPED | OUTPATIENT
Start: 2024-10-25

## 2024-10-29 ENCOUNTER — TELEPHONE (OUTPATIENT)
Dept: CARDIOLOGY | Facility: CLINIC | Age: 79
End: 2024-10-29
Payer: MEDICARE

## 2024-10-29 NOTE — TELEPHONE ENCOUNTER
RELAY    Calling to remind you of your appointment with Dr. Ambrosio on 10/30 @ 1:15. Please bring your insurance cards, ID, and medications in their original bottles. If you've had any blood work or been in the hospital or ER since your last visit, we need to know when and where so we can obtain those records.

## 2024-10-30 ENCOUNTER — OFFICE VISIT (OUTPATIENT)
Dept: CARDIOLOGY | Facility: CLINIC | Age: 79
End: 2024-10-30
Payer: MEDICARE

## 2024-10-30 VITALS
BODY MASS INDEX: 26.85 KG/M2 | DIASTOLIC BLOOD PRESSURE: 74 MMHG | HEIGHT: 61 IN | SYSTOLIC BLOOD PRESSURE: 142 MMHG | OXYGEN SATURATION: 98 % | HEART RATE: 83 BPM | WEIGHT: 142.2 LBS

## 2024-10-30 DIAGNOSIS — I51.89 GRADE I DIASTOLIC DYSFUNCTION: ICD-10-CM

## 2024-10-30 DIAGNOSIS — I65.23 BILATERAL CAROTID ARTERY STENOSIS: ICD-10-CM

## 2024-10-30 DIAGNOSIS — E78.2 MIXED HYPERLIPIDEMIA: ICD-10-CM

## 2024-10-30 DIAGNOSIS — Z86.79 HISTORY OF CARDIOMYOPATHY: Primary | ICD-10-CM

## 2024-10-30 DIAGNOSIS — F17.210 CIGARETTE SMOKER: ICD-10-CM

## 2024-10-30 DIAGNOSIS — I73.9 CLAUDICATION: ICD-10-CM

## 2024-10-30 DIAGNOSIS — I42.0 CARDIOMYOPATHY, DILATED: ICD-10-CM

## 2024-10-30 DIAGNOSIS — E78.5 HYPERLIPIDEMIA, UNSPECIFIED HYPERLIPIDEMIA TYPE: ICD-10-CM

## 2024-10-30 DIAGNOSIS — R06.02 SOB (SHORTNESS OF BREATH) ON EXERTION: ICD-10-CM

## 2024-10-30 DIAGNOSIS — I10 PRIMARY HYPERTENSION: ICD-10-CM

## 2024-10-30 RX ORDER — LOSARTAN POTASSIUM AND HYDROCHLOROTHIAZIDE 12.5; 5 MG/1; MG/1
1 TABLET ORAL DAILY
Qty: 90 TABLET | Refills: 1 | Status: SHIPPED | OUTPATIENT
Start: 2024-10-30

## 2024-10-30 RX ORDER — METOPROLOL SUCCINATE 25 MG/1
25 TABLET, EXTENDED RELEASE ORAL DAILY
Qty: 90 TABLET | Refills: 3 | Status: SHIPPED | OUTPATIENT
Start: 2024-10-30

## 2024-10-30 RX ORDER — FUROSEMIDE 20 MG/1
20 TABLET ORAL DAILY
Qty: 90 TABLET | Refills: 3 | Status: SHIPPED | OUTPATIENT
Start: 2024-10-30

## 2024-10-30 RX ORDER — PRAVASTATIN SODIUM 80 MG/1
80 TABLET ORAL DAILY
Qty: 90 TABLET | Refills: 3 | Status: SHIPPED | OUTPATIENT
Start: 2024-10-30

## 2024-10-30 NOTE — PROGRESS NOTES
Subjective   Follow up, HTN, CMP  Marci Stuart is a 79 y.o. female who presents to day for Follow-up (6mth).    CHIEF COMPLIANT  Chief Complaint   Patient presents with    Follow-up     6mth       Active Problems:  Problem List Items Addressed This Visit          Cardiac and Vasculature    Cardiomyopathy, dilated    Relevant Medications    metoprolol succinate XL (TOPROL-XL) 25 MG 24 hr tablet    Hyperlipidemia    Relevant Medications    pravastatin (PRAVACHOL) 80 MG tablet    Other Relevant Orders    Comprehensive Metabolic Panel    Lipid Panel    BP (high blood pressure)    Relevant Medications    furosemide (LASIX) 20 MG tablet    losartan-hydrochlorothiazide (HYZAAR) 50-12.5 MG per tablet    metoprolol succinate XL (TOPROL-XL) 25 MG 24 hr tablet    SOB (shortness of breath) on exertion    Bilateral carotid artery stenosis    Relevant Medications    pravastatin (PRAVACHOL) 80 MG tablet    Claudication    History of cardiomyopathy - Primary       Tobacco    Cigarette smoker     Other Visit Diagnoses       Grade I diastolic dysfunction        Relevant Medications    furosemide (LASIX) 20 MG tablet            HPI  HPI  She has been doing well she has been exercising on the treadmill with no shortness of breath or chest pain, no edema or palpitations, she says her blood pressure is really really be well-controlled at home with the blood pressure today showed systolic pressure 117, unfortunately she continues to smoke she denies intermittent claudications  PRIOR MEDS  Current Outpatient Medications on File Prior to Visit   Medication Sig Dispense Refill    aspirin 81 MG EC tablet Take 1 tablet by mouth Daily. 90 tablet 3    Bioflavonoid Products (BRAD C PO) Take 1,000 mg by mouth Daily.      Calcium Carbonate 1500 (600 Ca) MG tablet Take 1 tablet by mouth Daily.      Emollient (COLLAGEN EX) Apply  topically.      Fexofenadine HCl (ALLEGRA ALLERGY PO) Take  by mouth Daily.      MULTIPLE VITAMIN PO Take  by mouth  daily.      Multiple Vitamins-Minerals (EMERGEN-C IMMUNE+ PO) Take  by mouth.      NON FORMULARY Hair skin and nails vitamins - 1 tablet daily      Psyllium (Metamucil) wafer wafer Take  by mouth Daily.      vitamin B-12 (CYANOCOBALAMIN) 1000 MCG tablet Take 1 tablet by mouth Daily.      vitamin D3 125 MCG (5000 UT) capsule capsule Take 1 capsule by mouth Daily.      [DISCONTINUED] furosemide (LASIX) 20 MG tablet Take 1 tablet by mouth Daily. 90 tablet 3    [DISCONTINUED] losartan-hydrochlorothiazide (HYZAAR) 50-12.5 MG per tablet Take 1 tablet by mouth Daily. 90 tablet 1    [DISCONTINUED] metoprolol succinate XL (TOPROL-XL) 25 MG 24 hr tablet Take 1 tablet by mouth Daily. 90 tablet 3    [DISCONTINUED] pravastatin (PRAVACHOL) 80 MG tablet TAKE 1 TABLET BY MOUTH ONCE DAILY (Patient taking differently: Take 0.5 tablets by mouth Daily.) 90 tablet 3     No current facility-administered medications on file prior to visit.       ALLERGIES  Cetirizine, Lisinopril, and Sulfa antibiotics    HISTORY  Past Medical History:   Diagnosis Date    Coronary artery disease     COVID-19 11/01/2022    COVID-19 vaccine administered 1st -03/03/2021    2nd - 03/31/2021 - Moderna     Hyperlipidemia     Hypertension        Social History     Socioeconomic History    Marital status:    Tobacco Use    Smoking status: Former     Current packs/day: 1.00     Average packs/day: 1 pack/day for 39.0 years (39.0 ttl pk-yrs)     Types: Cigarettes    Smokeless tobacco: Never   Vaping Use    Vaping status: Never Used   Substance and Sexual Activity    Alcohol use: No     Comment: A LITTLE WINE    Drug use: Never    Sexual activity: Defer       Family History   Problem Relation Age of Onset    Heart attack Father     Diabetes Other     Hyperlipidemia Other     Hypertension Other     No Known Problems Mother        Review of Systems   Constitutional:  Negative for fatigue.   HENT:  Positive for rhinorrhea (allergies). Negative for congestion and  "sore throat.    Eyes:  Positive for visual disturbance (Glasses daily).   Respiratory:  Positive for shortness of breath (When walking on her treadmill). Negative for chest tightness.    Cardiovascular:  Negative for chest pain, palpitations and leg swelling.   Gastrointestinal: Negative.    Genitourinary: Negative.    Allergic/Immunologic: Positive for environmental allergies.   Neurological:  Negative for dizziness, weakness, numbness and headaches.   Hematological:  Bruises/bleeds easily.   Psychiatric/Behavioral:  Negative for sleep disturbance.        Objective     VITALS: /74   Pulse 83   Ht 154.9 cm (61\")   Wt 64.5 kg (142 lb 3.2 oz)   SpO2 98%   BMI 26.87 kg/m²     LABS:   Lab Results (most recent)       None            IMAGING:   No Images in the past 120 days found..    EXAM:  Physical Exam  Vitals reviewed.   Constitutional:       Appearance: She is well-developed.   HENT:      Head: Normocephalic and atraumatic.   Eyes:      Pupils: Pupils are equal, round, and reactive to light.   Neck:      Thyroid: No thyromegaly.      Vascular: No JVD.   Cardiovascular:      Rate and Rhythm: Normal rate and regular rhythm.      Heart sounds: Normal heart sounds. No murmur heard.     No friction rub. No gallop.   Pulmonary:      Effort: Pulmonary effort is normal. No respiratory distress.      Breath sounds: Normal breath sounds. No stridor. No wheezing or rales.   Chest:      Chest wall: No tenderness.   Musculoskeletal:         General: No tenderness or deformity.      Cervical back: Neck supple.   Skin:     General: Skin is warm and dry.   Neurological:      Mental Status: She is alert and oriented to person, place, and time.      Cranial Nerves: No cranial nerve deficit.      Coordination: Coordination normal.         Procedure   Procedures        Assessment & Plan     Diagnoses and all orders for this visit:    1. History of cardiomyopathy (Primary)    2. Primary hypertension  -     " losartan-hydrochlorothiazide (HYZAAR) 50-12.5 MG per tablet; Take 1 tablet by mouth Daily.  Dispense: 90 tablet; Refill: 1  -     metoprolol succinate XL (TOPROL-XL) 25 MG 24 hr tablet; Take 1 tablet by mouth Daily.  Dispense: 90 tablet; Refill: 3    3. Bilateral carotid artery stenosis  -     pravastatin (PRAVACHOL) 80 MG tablet; Take 1 tablet by mouth Daily.  Dispense: 90 tablet; Refill: 3    4. Claudication    5. Mixed hyperlipidemia  -     Comprehensive Metabolic Panel; Future  -     Lipid Panel; Future    6. SOB (shortness of breath) on exertion    7. Cigarette smoker    8. Grade I diastolic dysfunction  -     furosemide (LASIX) 20 MG tablet; Take 1 tablet by mouth Daily.  Dispense: 90 tablet; Refill: 3    9. Cardiomyopathy, dilated  -     metoprolol succinate XL (TOPROL-XL) 25 MG 24 hr tablet; Take 1 tablet by mouth Daily.  Dispense: 90 tablet; Refill: 3    10. Hyperlipidemia, unspecified hyperlipidemia type  -     pravastatin (PRAVACHOL) 80 MG tablet; Take 1 tablet by mouth Daily.  Dispense: 90 tablet; Refill: 3    1.  We discussed the results of the CTA of the carotids on the 5/14/2024.  And that showed only 30% stenosis with also a Bovie and arch with 30% stenosis we will continue with the medical management  2.  Blood pressure is better slight elevated here today but her blood pressure this morning is 117 she says at home and is usually well-controlled so we will continue the current medications  3.  She is denying any claudication she is actually warm walking every day on the treadmill will continue with  4.  She is denied any chest pain or recent shortness of breath  5.  Unfortunately I do not have any blood work I will try to get labs prior to her next visit, her lipids back on May 15, 2024 showed LDL of 59 elevated triglycerides of 270 and HDL 32 with unremarkable CMP  6.  Again I strongly advised her to quit smoking but she is not ready to do that    Return in about 6 months (around  4/30/2025).      Advance Care Planning   ACP discussion was declined by the patient. Patient does not have an advance directive, declines further assistance.             MEDS ORDERED DURING VISIT:  New Medications Ordered This Visit   Medications    furosemide (LASIX) 20 MG tablet     Sig: Take 1 tablet by mouth Daily.     Dispense:  90 tablet     Refill:  3     This prescription was filled on 1/24/2022. Any refills authorized will be placed on file.    losartan-hydrochlorothiazide (HYZAAR) 50-12.5 MG per tablet     Sig: Take 1 tablet by mouth Daily.     Dispense:  90 tablet     Refill:  1    metoprolol succinate XL (TOPROL-XL) 25 MG 24 hr tablet     Sig: Take 1 tablet by mouth Daily.     Dispense:  90 tablet     Refill:  3    pravastatin (PRAVACHOL) 80 MG tablet     Sig: Take 1 tablet by mouth Daily.     Dispense:  90 tablet     Refill:  3       As always, Coco Kasper, DO  I appreciate very much the opportunity to participate in the cardiovascular care of your patients. Please do not hesitate to call me with any questions with regards to Marci Stuart evaluation and management.         This document has been electronically signed by Darnell Ambrosio MD  October 30, 2024 13:43 EDT    This note is dictated utilizing voice recognition software.

## 2025-03-24 ENCOUNTER — LAB (OUTPATIENT)
Dept: LAB | Facility: HOSPITAL | Age: 80
End: 2025-03-24
Payer: MEDICARE

## 2025-03-24 DIAGNOSIS — E78.2 MIXED HYPERLIPIDEMIA: ICD-10-CM

## 2025-03-24 LAB
ALBUMIN SERPL-MCNC: 4.1 G/DL (ref 3.5–5.2)
ALBUMIN/GLOB SERPL: 1.1 G/DL
ALP SERPL-CCNC: 100 U/L (ref 39–117)
ALT SERPL W P-5'-P-CCNC: 11 U/L (ref 1–33)
ANION GAP SERPL CALCULATED.3IONS-SCNC: 9.6 MMOL/L (ref 5–15)
AST SERPL-CCNC: 19 U/L (ref 1–32)
BILIRUB SERPL-MCNC: 0.4 MG/DL (ref 0–1.2)
BUN SERPL-MCNC: 21 MG/DL (ref 8–23)
BUN/CREAT SERPL: 25 (ref 7–25)
CALCIUM SPEC-SCNC: 10.6 MG/DL (ref 8.6–10.5)
CHLORIDE SERPL-SCNC: 96 MMOL/L (ref 98–107)
CHOLEST SERPL-MCNC: 119 MG/DL (ref 0–200)
CO2 SERPL-SCNC: 29.4 MMOL/L (ref 22–29)
CREAT SERPL-MCNC: 0.84 MG/DL (ref 0.57–1)
EGFRCR SERPLBLD CKD-EPI 2021: 70.8 ML/MIN/1.73
GLOBULIN UR ELPH-MCNC: 3.6 GM/DL
GLUCOSE SERPL-MCNC: 90 MG/DL (ref 65–99)
HDLC SERPL-MCNC: 29 MG/DL (ref 40–60)
LDLC SERPL CALC-MCNC: 56 MG/DL (ref 0–100)
LDLC/HDLC SERPL: 1.67 {RATIO}
POTASSIUM SERPL-SCNC: 4.2 MMOL/L (ref 3.5–5.2)
PROT SERPL-MCNC: 7.7 G/DL (ref 6–8.5)
SODIUM SERPL-SCNC: 135 MMOL/L (ref 136–145)
TRIGL SERPL-MCNC: 208 MG/DL (ref 0–150)
VLDLC SERPL-MCNC: 34 MG/DL (ref 5–40)

## 2025-03-24 PROCEDURE — 80053 COMPREHEN METABOLIC PANEL: CPT

## 2025-03-24 PROCEDURE — 80061 LIPID PANEL: CPT

## 2025-03-24 PROCEDURE — 36415 COLL VENOUS BLD VENIPUNCTURE: CPT

## 2025-04-23 ENCOUNTER — TELEPHONE (OUTPATIENT)
Dept: CARDIOLOGY | Facility: CLINIC | Age: 80
End: 2025-04-23

## 2025-04-23 NOTE — TELEPHONE ENCOUNTER
Unable to LVM for patient    RELAY:    It looks like this was just an appointment reminder call for her appointment next week.

## 2025-04-30 ENCOUNTER — OFFICE VISIT (OUTPATIENT)
Dept: CARDIOLOGY | Facility: CLINIC | Age: 80
End: 2025-04-30
Payer: MEDICARE

## 2025-04-30 VITALS
DIASTOLIC BLOOD PRESSURE: 62 MMHG | HEIGHT: 61 IN | BODY MASS INDEX: 26.81 KG/M2 | SYSTOLIC BLOOD PRESSURE: 110 MMHG | OXYGEN SATURATION: 94 % | WEIGHT: 142 LBS | HEART RATE: 89 BPM

## 2025-04-30 DIAGNOSIS — I65.23 BILATERAL CAROTID ARTERY STENOSIS: ICD-10-CM

## 2025-04-30 DIAGNOSIS — Z86.79 HISTORY OF CARDIOMYOPATHY: ICD-10-CM

## 2025-04-30 DIAGNOSIS — R06.02 SOB (SHORTNESS OF BREATH) ON EXERTION: ICD-10-CM

## 2025-04-30 DIAGNOSIS — I51.89 GRADE I DIASTOLIC DYSFUNCTION: ICD-10-CM

## 2025-04-30 DIAGNOSIS — I10 PRIMARY HYPERTENSION: Primary | ICD-10-CM

## 2025-04-30 DIAGNOSIS — E78.2 MIXED HYPERLIPIDEMIA: ICD-10-CM

## 2025-04-30 DIAGNOSIS — R07.89 CHEST TIGHTNESS: ICD-10-CM

## 2025-04-30 DIAGNOSIS — E78.5 HYPERLIPIDEMIA, UNSPECIFIED HYPERLIPIDEMIA TYPE: ICD-10-CM

## 2025-04-30 DIAGNOSIS — F17.210 CIGARETTE SMOKER: ICD-10-CM

## 2025-04-30 DIAGNOSIS — I65.23 ASYMPTOMATIC STENOSIS OF BOTH CAROTID ARTERIES WITHOUT INFARCTION: ICD-10-CM

## 2025-04-30 DIAGNOSIS — I42.0 CARDIOMYOPATHY, DILATED: ICD-10-CM

## 2025-04-30 RX ORDER — PRAVASTATIN SODIUM 80 MG/1
80 TABLET ORAL DAILY
Qty: 90 TABLET | Refills: 3 | Status: SHIPPED | OUTPATIENT
Start: 2025-04-30

## 2025-04-30 RX ORDER — FUROSEMIDE 20 MG/1
20 TABLET ORAL DAILY
Qty: 90 TABLET | Refills: 3 | Status: SHIPPED | OUTPATIENT
Start: 2025-04-30

## 2025-04-30 RX ORDER — LOSARTAN POTASSIUM AND HYDROCHLOROTHIAZIDE 12.5; 5 MG/1; MG/1
1 TABLET ORAL DAILY
Qty: 90 TABLET | Refills: 1 | Status: SHIPPED | OUTPATIENT
Start: 2025-04-30

## 2025-04-30 RX ORDER — METOPROLOL SUCCINATE 25 MG/1
25 TABLET, EXTENDED RELEASE ORAL DAILY
Qty: 90 TABLET | Refills: 3 | Status: SHIPPED | OUTPATIENT
Start: 2025-04-30

## 2025-04-30 NOTE — PROGRESS NOTES
Subjective   Follow up, HTN, CMP  Marci Stuart is a 79 y.o. female who presents to day for Follow-up (Here for 6 mo. F/u), Carotid Artery Disease, Congestive Heart Failure, Cardiomyopathy, Hyperlipidemia, Hypertension, and Shortness of Breath.    CHIEF COMPLIANT  Chief Complaint   Patient presents with    Follow-up     Here for 6 mo. F/u    Carotid Artery Disease    Congestive Heart Failure    Cardiomyopathy    Hyperlipidemia    Hypertension    Shortness of Breath     Problem List:     1. Dilated cardiomyopathy, EF initially felt to be 30 to 35%.      1.1 echo 5/19/2020-EF 56 to 60%, mild LVH, diastolic dysfunction 1, trivial to mild MR, trivial TR  2.stress test 1/8/2021-no evidence of ischemia, post-rest EF 58%      3. Hypertension  4. Dyslipidemia  5. WILLI / PAD      5.1 carotid artery ultrasound 5/19/2020-moderate bifurcation disease bilaterally 16 to 49%, relatively discrete 50 to 75% stenosis in the proximal right turn carotid artery with 50% or less or stenosis in the proximal and mid portions of left internal carotid artery, antegrade flow both vertebral arteries       5.2 right carotid endarterectomy 8/5/2020, Dr. Amaya       5.3 CTA of the carotids 2/8/2021-mild plaque formation at the bulb of the left less than 30%, 30% stenosis in the right subclavian artery and less than 30% in the left, 0.3 cm noncalcified nodule in the left upper lobe       6. PVD        6.1 CTA of abdomen with runoff 8/31/2022-high-grade significant stenosis distal right superficial femoral artery with stenosis estimated 95%, no other areas of high-grade stenosis, prominent diverticulosis left and sigmoid colon with mild inflammatory changes (referred to Dr. Baird)       6.3 RLE Angioplasty 10/27/2022 with Dr. Baird, on Plavix and ASA    Problem List Items Addressed This Visit          Cardiac and Vasculature    Asymptomatic carotid artery narrowing without infarction    Cardiomyopathy, dilated    Relevant Medications     metoprolol succinate XL (TOPROL-XL) 25 MG 24 hr tablet    Hyperlipidemia    Relevant Medications    pravastatin (PRAVACHOL) 80 MG tablet    Other Relevant Orders    Lipid Panel    Comprehensive Metabolic Panel    BP (high blood pressure) - Primary    Relevant Medications    furosemide (LASIX) 20 MG tablet    losartan-hydrochlorothiazide (HYZAAR) 50-12.5 MG per tablet    metoprolol succinate XL (TOPROL-XL) 25 MG 24 hr tablet    SOB (shortness of breath) on exertion    Bilateral carotid artery stenosis    Relevant Medications    pravastatin (PRAVACHOL) 80 MG tablet    Chest tightness    RESOLVED: History of cardiomyopathy       Tobacco    Cigarette smoker     Other Visit Diagnoses         Grade I diastolic dysfunction        Relevant Medications    furosemide (LASIX) 20 MG tablet            HPI    She has been feeling well with no reported chest pain no shortness of breath at level of activity no edema or palpitations no claudication symptoms she stated that she still smokes a little bit but she is trying to cut down                    History of Present Illness         PRIOR MEDS  Current Outpatient Medications on File Prior to Visit   Medication Sig Dispense Refill    aspirin 81 MG EC tablet Take 1 tablet by mouth Daily. 90 tablet 3    Bioflavonoid Products (BRAD C PO) Take 1,000 mg by mouth Daily.      Emollient (COLLAGEN EX) Apply  topically.      Fexofenadine HCl (ALLEGRA ALLERGY PO) Take  by mouth Daily.      MULTIPLE VITAMIN PO Take  by mouth daily.      Multiple Vitamins-Minerals (EMERGEN-C IMMUNE+ PO) Take  by mouth Daily.      NON FORMULARY Hair skin and nails vitamins - 1 tablet daily      Psyllium (Metamucil) wafer wafer Take  by mouth Daily.      vitamin B-12 (CYANOCOBALAMIN) 1000 MCG tablet Take 1 tablet by mouth Daily.      vitamin D3 125 MCG (5000 UT) capsule capsule Take 1 capsule by mouth Daily.      [DISCONTINUED] furosemide (LASIX) 20 MG tablet Take 1 tablet by mouth Daily. 90 tablet 3     [DISCONTINUED] losartan-hydrochlorothiazide (HYZAAR) 50-12.5 MG per tablet Take 1 tablet by mouth Daily. 90 tablet 1    [DISCONTINUED] metoprolol succinate XL (TOPROL-XL) 25 MG 24 hr tablet Take 1 tablet by mouth Daily. 90 tablet 3    [DISCONTINUED] pravastatin (PRAVACHOL) 80 MG tablet Take 1 tablet by mouth Daily. 90 tablet 3    [DISCONTINUED] Calcium Carbonate 1500 (600 Ca) MG tablet Take 1 tablet by mouth Daily.       No current facility-administered medications on file prior to visit.       ALLERGIES  Cetirizine, Lisinopril, and Sulfa antibiotics    HISTORY  Past Medical History:   Diagnosis Date    Coronary artery disease     COVID-19 11/01/2022    COVID-19 vaccine administered 1st -03/03/2021 2nd - 03/31/2021 - Moderna     Hyperlipidemia     Hypertension        Social History     Socioeconomic History    Marital status:    Tobacco Use    Smoking status: Former     Current packs/day: 1.00     Average packs/day: 1 pack/day for 39.0 years (39.0 ttl pk-yrs)     Types: Cigarettes    Smokeless tobacco: Never   Vaping Use    Vaping status: Never Used   Substance and Sexual Activity    Alcohol use: No     Comment: A LITTLE WINE    Drug use: Never    Sexual activity: Defer       Family History   Problem Relation Age of Onset    Heart attack Father     Diabetes Other     Hyperlipidemia Other     Hypertension Other     No Known Problems Mother        Review of Systems   Constitutional: Negative.    HENT: Negative.     Eyes:  Positive for visual disturbance (glasses).   Respiratory: Negative.     Cardiovascular: Negative.    Gastrointestinal: Negative.    Endocrine: Negative.    Genitourinary: Negative.    Musculoskeletal:  Positive for arthralgias and myalgias.   Skin: Negative.    Allergic/Immunologic: Positive for environmental allergies.   Neurological: Negative.    Hematological:  Bruises/bleeds easily.   Psychiatric/Behavioral: Negative.         Objective     VITALS: /62 (BP Location: Left arm,  "Patient Position: Sitting)   Pulse 89   Ht 154.9 cm (60.98\")   Wt 64.4 kg (142 lb)   SpO2 94%   BMI 26.84 kg/m²     LABS:   Lab Results (most recent)       None            IMAGING:   No Images in the past 120 days found..    EXAM:  Physical Exam  Vitals reviewed.   Constitutional:       Appearance: She is well-developed.   HENT:      Head: Normocephalic and atraumatic.   Eyes:      Pupils: Pupils are equal, round, and reactive to light.   Neck:      Thyroid: No thyromegaly.      Vascular: Carotid bruit present. No JVD.      Comments: Left carotid bruit  Cardiovascular:      Rate and Rhythm: Normal rate and regular rhythm.      Heart sounds: Normal heart sounds. No murmur heard.     No friction rub. No gallop.   Pulmonary:      Effort: Pulmonary effort is normal. No respiratory distress.      Breath sounds: Normal breath sounds. No stridor. No wheezing or rales.   Chest:      Chest wall: No tenderness.   Musculoskeletal:         General: No tenderness or deformity.      Cervical back: Neck supple.   Skin:     General: Skin is warm and dry.   Neurological:      Mental Status: She is alert and oriented to person, place, and time.      Cranial Nerves: No cranial nerve deficit.      Coordination: Coordination normal.       Physical Exam         Procedure   Procedures      Results         Assessment & Plan     Diagnoses and all orders for this visit:    1. Primary hypertension (Primary)  -     losartan-hydrochlorothiazide (HYZAAR) 50-12.5 MG per tablet; Take 1 tablet by mouth Daily.  Dispense: 90 tablet; Refill: 1  -     metoprolol succinate XL (TOPROL-XL) 25 MG 24 hr tablet; Take 1 tablet by mouth Daily.  Dispense: 90 tablet; Refill: 3    2. SOB (shortness of breath) on exertion    3. Mixed hyperlipidemia  -     Lipid Panel; Future  -     Comprehensive Metabolic Panel; Future    4. History of cardiomyopathy    5. Chest tightness    6. Bilateral carotid artery stenosis  -     pravastatin (PRAVACHOL) 80 MG tablet; " Take 1 tablet by mouth Daily.  Dispense: 90 tablet; Refill: 3    7. Cigarette smoker    8. Asymptomatic stenosis of both carotid arteries without infarction    9. Grade I diastolic dysfunction  -     furosemide (LASIX) 20 MG tablet; Take 1 tablet by mouth Daily.  Dispense: 90 tablet; Refill: 3    10. Cardiomyopathy, dilated  -     metoprolol succinate XL (TOPROL-XL) 25 MG 24 hr tablet; Take 1 tablet by mouth Daily.  Dispense: 90 tablet; Refill: 3    11. Hyperlipidemia, unspecified hyperlipidemia type  -     pravastatin (PRAVACHOL) 80 MG tablet; Take 1 tablet by mouth Daily.  Dispense: 90 tablet; Refill: 3    1.  Her blood pressure is well-controlled we will continue current management  2.  As she is denying any shortness of breath or symptoms of activity she declined repeating the echo for assessment of the cardiomyopathy the last echo showed recovery of systolic function we will continue with the GDMT medications  3.  Again carotid CT angiogram back on May 2024 showed only 30% stenosis, Dr. Baird is following  4.  We discussed the labs with good LDL level but low HDL and elevated triglycerides she is not trying to change her diet and using coconut oil will repeat labs prior to next time  5.  Again I advised her to quit I had a long discussion with the patient regarding tobacco cessation. I informed the patient regarding the risks of using tobacco and I also informed the patient regarding the benefits of cessation and available help including medications and other help available. All of this was discussed and the  6.  No recent chest pain continue current management  Assessment & Plan         Return in about 6 months (around 10/30/2025).      Advance Care Planning   ACP discussion was held with the patient during this visit. Info. Previously provided             MEDS ORDERED DURING VISIT:  New Medications Ordered This Visit   Medications    furosemide (LASIX) 20 MG tablet     Sig: Take 1 tablet by mouth Daily.      Dispense:  90 tablet     Refill:  3     This prescription was filled on 1/24/2022. Any refills authorized will be placed on file.    losartan-hydrochlorothiazide (HYZAAR) 50-12.5 MG per tablet     Sig: Take 1 tablet by mouth Daily.     Dispense:  90 tablet     Refill:  1    metoprolol succinate XL (TOPROL-XL) 25 MG 24 hr tablet     Sig: Take 1 tablet by mouth Daily.     Dispense:  90 tablet     Refill:  3    pravastatin (PRAVACHOL) 80 MG tablet     Sig: Take 1 tablet by mouth Daily.     Dispense:  90 tablet     Refill:  3         As always, Coco Kasper, DO  I appreciate very much the opportunity to participate in the cardiovascular care of your patients. Please do not hesitate to call me with any questions with regards to Marci Stuart evaluation and management.     Patient or patient representative verbalized consent for the use of Ambient Listening during the visit with  Darnell Ambrosio MD for chart documentation. 4/30/2025  13:19 EDT       This document has been electronically signed by Darnell Ambrosio MD  April 30, 2025 13:41 EDT    This note is dictated utilizing voice recognition software.